# Patient Record
Sex: MALE | URBAN - METROPOLITAN AREA
[De-identification: names, ages, dates, MRNs, and addresses within clinical notes are randomized per-mention and may not be internally consistent; named-entity substitution may affect disease eponyms.]

---

## 2023-05-27 ENCOUNTER — INPATIENT (INPATIENT)
Facility: HOSPITAL | Age: 68
LOS: 6 days | Discharge: HOME | End: 2023-06-03
Attending: HOSPITALIST | Admitting: HOSPITALIST
Payer: MEDICAID

## 2023-05-27 VITALS
RESPIRATION RATE: 20 BRPM | SYSTOLIC BLOOD PRESSURE: 178 MMHG | DIASTOLIC BLOOD PRESSURE: 103 MMHG | TEMPERATURE: 95 F | OXYGEN SATURATION: 100 % | HEART RATE: 62 BPM

## 2023-05-27 PROCEDURE — 99285 EMERGENCY DEPT VISIT HI MDM: CPT

## 2023-05-27 RX ORDER — ASPIRIN/CALCIUM CARB/MAGNESIUM 324 MG
324 TABLET ORAL ONCE
Refills: 0 | Status: COMPLETED | OUTPATIENT
Start: 2023-05-27 | End: 2023-05-27

## 2023-05-27 RX ADMIN — Medication 324 MILLIGRAM(S): at 23:48

## 2023-05-27 NOTE — ED ADULT NURSE NOTE - NSFALLUNIVINTERV_ED_ALL_ED
Bed/Stretcher in lowest position, wheels locked, appropriate side rails in place/Call bell, personal items and telephone in reach/Instruct patient to call for assistance before getting out of bed/chair/stretcher/Non-slip footwear applied when patient is off stretcher/[place] to call system/Physically safe environment - no spills, clutter or unnecessary equipment/Purposeful proactive rounding/Room/bathroom lighting operational, light cord in reach

## 2023-05-27 NOTE — ED PROVIDER NOTE - ATTENDING CONTRIBUTION TO CARE
Adelina Askew MD attending physician the patient arrives in the company of his family.  He was at a family party today and he was drinking fairly heavily and then got to a point where he started looking like he was going to pass out.  As per the family it does not appear that he is a daily drinker.  His listed medications include Voltaren Cardura or Norvasc enalapril metformin atorvastatin aspirin pantoprazole omeprazole as skin which lowers triglycerides paracetamol loperamide gliclazide T ADA LAF IL.  Here the patient is interactive is able to say that he is in the hospital but is clearly intoxicated.  His abdomen is soft no rebound no guarding and he is nontremulous blood pressure is 178/103 respiratory rate 20 heart rate is 62 and his temperature is 95.4 his fingerstick in triage was 80.  He is hypothermic and I am a little concerned about his sugar of 80.  I would send labs and make sure that he can eat something here given the possibility for hypoglycemia with eating alcohol.  He is able to move all extremities I am not concerned about something like a stroke he also needs an EKG he will be signed out at midnight to the next team    I performed a history and physical exam of the patient and discussed their management with the resident and /or advanced care provider. I reviewed the resident and /or ACP's note and agree with the documented findings and plan of care. My medical decison making and observations are found above.

## 2023-05-27 NOTE — ED ADULT NURSE NOTE - OBJECTIVE STATEMENT
pt received to  rm 1 , a&ox4 , ambulatory , phx of DM 2  , p/w near syncopal episode when drinking. pt flew to [place] from [place] today  and was drinking at a party . Pt breathing even and unlabored on room air.  Denies fever, chills, cough, SOB, chest pain, palpitations, dizziness, N/V/D, constipation, numbness, tingling. IV placed. Labs collected and sent. EKG in chart. pending lab r  . pt educated on fall precautions and confirms understanding via teach back method. Stretcher locked in lowest position with siderails up x2. Call bell and personal items within reach.

## 2023-05-27 NOTE — ED PROVIDER NOTE - CLINICAL SUMMARY MEDICAL DECISION MAKING FREE TEXT BOX
Adelina Askew MD attending physician the patient arrives in the company of his family.  He was at a family party today and he was drinking fairly heavily and then got to a point where he started looking like he was going to pass out.  As per the family it does not appear that he is a daily drinker.  His listed medications include Voltaren Cardura or Norvasc enalapril metformin atorvastatin aspirin pantoprazole omeprazole as skin which lowers triglycerides paracetamol loperamide gliclazide T ADA LAF IL.  Here the patient is interactive is able to say that he is in the hospital but is clearly intoxicated.  His abdomen is soft no rebound no guarding and he is nontremulous blood pressure is 178/103 respiratory rate 20 heart rate is 62 and his temperature is 95.4 his fingerstick in triage was 80.  He is hypothermic and I am a little concerned about his sugar of 80.  I would send labs and make sure that he can eat something here given the possibility for hypoglycemia with eating alcohol.  He is able to move all extremities I am not concerned about something like a stroke he also needs an EKG he will be signed out at midnight to the next team Adelina Askew MD attending physician the patient arrives in the company of his family.  He was at a family party today and he was drinking fairly heavily and then got to a point where he started looking like he was going to pass out.  As per the family it does not appear that he is a daily drinker.  His listed medications include Voltaren Cardura or Norvasc enalapril metformin atorvastatin aspirin pantoprazole omeprazole as skin which lowers triglycerides paracetamol loperamide gliclazide T ADA LAF IL.  Here the patient is interactive is able to say that he is in the hospital but is clearly intoxicated.  His abdomen is soft no rebound no guarding and he is nontremulous blood pressure is 178/103 respiratory rate 20 heart rate is 62 and his temperature is 95.4 his fingerstick in triage was 80.  He is hypothermic and I am a little concerned about his sugar of 80.  I would send labs and make sure that he can eat something here given the possibility for hypoglycemia with eating alcohol.  He is able to move all extremities I am not concerned about something like a stroke he also needs an EKG he will be signed out at midnight to the next team    Of note family adds that he had an episode where he nearly passed out and appeared to be short of breath when he was at the party.  His EKG shows T waves that are flipped laterally and flat T waves inferiorly.  There could be a cardiac origin for this.  He will require repeat EKG and repeat troponins

## 2023-05-27 NOTE — ED PROVIDER NOTE - CONSTITUTIONAL, MLM
Well appearing, awake, alert, oriented to person, place, time/situation and in no apparent distress. +mild intox normal...

## 2023-05-27 NOTE — ED ADULT NURSE NOTE - CHIEF COMPLAINT QUOTE
intox    pt arrived from [place] today.. speaks Amharic and Vietnamese.  pt c/o not feeling well. admits to having several bacardis.  pt aa&ox3. denies pain.  pmhx- cardiac, diabetes.  states he has an allergy to med he received in [place] for fever but cannot recall the name.  cousin- shauna booth [phone].. brother in law maxine leon erase [phone]

## 2023-05-27 NOTE — ED PROVIDER NOTE - OBJECTIVE STATEMENT
ambulatory
68-year-old male history of hypertension, diabetes, hyperlipidemia multiple antihypertensives and metformin presenting  with near syncopal episode.   Accompanied by family at bedside who are providing collateral information as.  Is intoxicated.  Patient was at a family party earlier this evening, had a couple glasses of Bacardi and had a near syncopal episode.  Patient did not lose consciousness, however had his eyes closed and was having difficulty breathing so was brought over to the ER.  Patient lives in [place] and  is not followed by cardiology.  Has never had stents in the past.  He takes a baby aspirin every day.  Patient is currently intoxicated but able to answer questions and is cooperative with exam.  He is denying any chest pain or shortness of breath currently.  No nausea, vomiting, fevers or chills.

## 2023-05-27 NOTE — ED ADULT TRIAGE NOTE - CHIEF COMPLAINT QUOTE
intox    pt arrived from [place] today.. speaks Setswana and Latvian.  pt c/o not feeling well. admits to having several bacardis.  pt aa&ox3. denies pain.  pmhx- cardiac, diabetes.  states he has an allergy to med he received in [place] for fever but cannot recall the name. intox    pt arrived from [place] today.. speaks Ukrainian and Belarusian.  pt c/o not feeling well. admits to having several bacardis.  pt aa&ox3. denies pain.  pmhx- cardiac, diabetes.  states he has an allergy to med he received in [place] for fever but cannot recall the name.  cousin- shauna booth [phone].. brother in law maxine leon erase [phone]

## 2023-05-27 NOTE — ED PROVIDER NOTE - CPE EDP HEME LYMPH NORM
Pre-Surgery Instructions:   Medication Instructions    cetirizine (ZyrTEC) 10 mg tablet Instructed patient per Anesthesia Guidelines   Multiple Vitamin (MULTIVITAMIN) tablet Instructed patient per Anesthesia Guidelines  normal...

## 2023-05-28 DIAGNOSIS — N17.9 ACUTE KIDNEY FAILURE, UNSPECIFIED: ICD-10-CM

## 2023-05-28 DIAGNOSIS — E78.5 HYPERLIPIDEMIA, UNSPECIFIED: ICD-10-CM

## 2023-05-28 DIAGNOSIS — Z29.9 ENCOUNTER FOR PROPHYLACTIC MEASURES, UNSPECIFIED: ICD-10-CM

## 2023-05-28 DIAGNOSIS — E11.9 TYPE 2 DIABETES MELLITUS WITHOUT COMPLICATIONS: ICD-10-CM

## 2023-05-28 DIAGNOSIS — R55 SYNCOPE AND COLLAPSE: ICD-10-CM

## 2023-05-28 DIAGNOSIS — R91.8 OTHER NONSPECIFIC ABNORMAL FINDING OF LUNG FIELD: ICD-10-CM

## 2023-05-28 DIAGNOSIS — I10 ESSENTIAL (PRIMARY) HYPERTENSION: ICD-10-CM

## 2023-05-28 LAB
A1C WITH ESTIMATED AVERAGE GLUCOSE RESULT: 4.6 % — SIGNIFICANT CHANGE UP (ref 4–5.6)
ALBUMIN SERPL ELPH-MCNC: 4.2 G/DL — SIGNIFICANT CHANGE UP (ref 3.3–5)
ALBUMIN SERPL ELPH-MCNC: 4.5 G/DL — SIGNIFICANT CHANGE UP (ref 3.3–5)
ALP SERPL-CCNC: 58 U/L — SIGNIFICANT CHANGE UP (ref 40–120)
ALP SERPL-CCNC: 67 U/L — SIGNIFICANT CHANGE UP (ref 40–120)
ALT FLD-CCNC: 22 U/L — SIGNIFICANT CHANGE UP (ref 4–41)
ALT FLD-CCNC: 27 U/L — SIGNIFICANT CHANGE UP (ref 4–41)
ANION GAP SERPL CALC-SCNC: 13 MMOL/L — SIGNIFICANT CHANGE UP (ref 7–14)
ANION GAP SERPL CALC-SCNC: 14 MMOL/L — SIGNIFICANT CHANGE UP (ref 7–14)
APTT BLD: 33.4 SEC — SIGNIFICANT CHANGE UP (ref 27–36.3)
AST SERPL-CCNC: 36 U/L — SIGNIFICANT CHANGE UP (ref 4–40)
AST SERPL-CCNC: 46 U/L — HIGH (ref 4–40)
BASOPHILS # BLD AUTO: 0.07 K/UL — SIGNIFICANT CHANGE UP (ref 0–0.2)
BASOPHILS NFR BLD AUTO: 2 % — SIGNIFICANT CHANGE UP (ref 0–2)
BILIRUB SERPL-MCNC: 0.3 MG/DL — SIGNIFICANT CHANGE UP (ref 0.2–1.2)
BILIRUB SERPL-MCNC: 0.4 MG/DL — SIGNIFICANT CHANGE UP (ref 0.2–1.2)
BUN SERPL-MCNC: 22 MG/DL — SIGNIFICANT CHANGE UP (ref 7–23)
BUN SERPL-MCNC: 22 MG/DL — SIGNIFICANT CHANGE UP (ref 7–23)
CALCIUM SERPL-MCNC: 8.7 MG/DL — SIGNIFICANT CHANGE UP (ref 8.4–10.5)
CALCIUM SERPL-MCNC: 9 MG/DL — SIGNIFICANT CHANGE UP (ref 8.4–10.5)
CHLORIDE SERPL-SCNC: 102 MMOL/L — SIGNIFICANT CHANGE UP (ref 98–107)
CHLORIDE SERPL-SCNC: 105 MMOL/L — SIGNIFICANT CHANGE UP (ref 98–107)
CHOLEST SERPL-MCNC: 164 MG/DL — SIGNIFICANT CHANGE UP
CO2 SERPL-SCNC: 19 MMOL/L — LOW (ref 22–31)
CO2 SERPL-SCNC: 20 MMOL/L — LOW (ref 22–31)
CREAT SERPL-MCNC: 1.49 MG/DL — HIGH (ref 0.5–1.3)
CREAT SERPL-MCNC: 1.61 MG/DL — HIGH (ref 0.5–1.3)
D DIMER BLD IA.RAPID-MCNC: <150 NG/ML DDU — SIGNIFICANT CHANGE UP
EGFR: 47 ML/MIN/1.73M2 — LOW
EGFR: 51 ML/MIN/1.73M2 — LOW
EOSINOPHIL # BLD AUTO: 0.1 K/UL — SIGNIFICANT CHANGE UP (ref 0–0.5)
EOSINOPHIL NFR BLD AUTO: 2.8 % — SIGNIFICANT CHANGE UP (ref 0–6)
ESTIMATED AVERAGE GLUCOSE: 85 — SIGNIFICANT CHANGE UP
ETHANOL SERPL-MCNC: 257 MG/DL — HIGH
GLUCOSE BLDC GLUCOMTR-MCNC: 101 MG/DL — HIGH (ref 70–99)
GLUCOSE BLDC GLUCOMTR-MCNC: 121 MG/DL — HIGH (ref 70–99)
GLUCOSE BLDC GLUCOMTR-MCNC: 122 MG/DL — HIGH (ref 70–99)
GLUCOSE BLDC GLUCOMTR-MCNC: 63 MG/DL — LOW (ref 70–99)
GLUCOSE BLDC GLUCOMTR-MCNC: 64 MG/DL — LOW (ref 70–99)
GLUCOSE BLDC GLUCOMTR-MCNC: 88 MG/DL — SIGNIFICANT CHANGE UP (ref 70–99)
GLUCOSE BLDC GLUCOMTR-MCNC: 95 MG/DL — SIGNIFICANT CHANGE UP (ref 70–99)
GLUCOSE SERPL-MCNC: 68 MG/DL — LOW (ref 70–99)
GLUCOSE SERPL-MCNC: 72 MG/DL — SIGNIFICANT CHANGE UP (ref 70–99)
HCT VFR BLD CALC: 32.8 % — LOW (ref 39–50)
HCT VFR BLD CALC: 35.1 % — LOW (ref 39–50)
HCV AB S/CO SERPL IA: 0.09 S/CO — SIGNIFICANT CHANGE UP (ref 0–0.99)
HCV AB SERPL-IMP: SIGNIFICANT CHANGE UP
HDLC SERPL-MCNC: 97 MG/DL — SIGNIFICANT CHANGE UP
HGB BLD-MCNC: 10.7 G/DL — LOW (ref 13–17)
HGB BLD-MCNC: 11.3 G/DL — LOW (ref 13–17)
IANC: 1.22 K/UL — LOW (ref 1.8–7.4)
IMM GRANULOCYTES NFR BLD AUTO: 0 % — SIGNIFICANT CHANGE UP (ref 0–0.9)
INR BLD: 0.96 RATIO — SIGNIFICANT CHANGE UP (ref 0.88–1.16)
LACTATE SERPL-SCNC: 2.1 MMOL/L — HIGH (ref 0.5–2)
LIPID PNL WITH DIRECT LDL SERPL: 23 MG/DL — SIGNIFICANT CHANGE UP
LYMPHOCYTES # BLD AUTO: 1.93 K/UL — SIGNIFICANT CHANGE UP (ref 1–3.3)
LYMPHOCYTES # BLD AUTO: 54.4 % — HIGH (ref 13–44)
MAGNESIUM SERPL-MCNC: 2 MG/DL — SIGNIFICANT CHANGE UP (ref 1.6–2.6)
MCHC RBC-ENTMCNC: 31.8 PG — SIGNIFICANT CHANGE UP (ref 27–34)
MCHC RBC-ENTMCNC: 32.2 GM/DL — SIGNIFICANT CHANGE UP (ref 32–36)
MCHC RBC-ENTMCNC: 32.6 GM/DL — SIGNIFICANT CHANGE UP (ref 32–36)
MCHC RBC-ENTMCNC: 32.8 PG — SIGNIFICANT CHANGE UP (ref 27–34)
MCV RBC AUTO: 101.7 FL — HIGH (ref 80–100)
MCV RBC AUTO: 97.6 FL — SIGNIFICANT CHANGE UP (ref 80–100)
MONOCYTES # BLD AUTO: 0.23 K/UL — SIGNIFICANT CHANGE UP (ref 0–0.9)
MONOCYTES NFR BLD AUTO: 6.5 % — SIGNIFICANT CHANGE UP (ref 2–14)
NEUTROPHILS # BLD AUTO: 1.22 K/UL — LOW (ref 1.8–7.4)
NEUTROPHILS NFR BLD AUTO: 34.3 % — LOW (ref 43–77)
NON HDL CHOLESTEROL: 67 MG/DL — SIGNIFICANT CHANGE UP
NRBC # BLD: 0 /100 WBCS — SIGNIFICANT CHANGE UP (ref 0–0)
NRBC # BLD: 0 /100 WBCS — SIGNIFICANT CHANGE UP (ref 0–0)
NRBC # FLD: 0 K/UL — SIGNIFICANT CHANGE UP (ref 0–0)
NRBC # FLD: 0 K/UL — SIGNIFICANT CHANGE UP (ref 0–0)
NT-PROBNP SERPL-SCNC: 284 PG/ML — SIGNIFICANT CHANGE UP
PLATELET # BLD AUTO: 216 K/UL — SIGNIFICANT CHANGE UP (ref 150–400)
PLATELET # BLD AUTO: 229 K/UL — SIGNIFICANT CHANGE UP (ref 150–400)
POTASSIUM SERPL-MCNC: 4.2 MMOL/L — SIGNIFICANT CHANGE UP (ref 3.5–5.3)
POTASSIUM SERPL-MCNC: 4.5 MMOL/L — SIGNIFICANT CHANGE UP (ref 3.5–5.3)
POTASSIUM SERPL-SCNC: 4.2 MMOL/L — SIGNIFICANT CHANGE UP (ref 3.5–5.3)
POTASSIUM SERPL-SCNC: 4.5 MMOL/L — SIGNIFICANT CHANGE UP (ref 3.5–5.3)
PROCALCITONIN SERPL-MCNC: 0.06 NG/ML — SIGNIFICANT CHANGE UP (ref 0.02–0.1)
PROT SERPL-MCNC: 6.7 G/DL — SIGNIFICANT CHANGE UP (ref 6–8.3)
PROT SERPL-MCNC: 7.5 G/DL — SIGNIFICANT CHANGE UP (ref 6–8.3)
PROTHROM AB SERPL-ACNC: 11.1 SEC — SIGNIFICANT CHANGE UP (ref 10.5–13.4)
RBC # BLD: 3.36 M/UL — LOW (ref 4.2–5.8)
RBC # BLD: 3.45 M/UL — LOW (ref 4.2–5.8)
RBC # FLD: 17.3 % — HIGH (ref 10.3–14.5)
RBC # FLD: 17.5 % — HIGH (ref 10.3–14.5)
SODIUM SERPL-SCNC: 135 MMOL/L — SIGNIFICANT CHANGE UP (ref 135–145)
SODIUM SERPL-SCNC: 138 MMOL/L — SIGNIFICANT CHANGE UP (ref 135–145)
TRIGL SERPL-MCNC: 222 MG/DL — HIGH
TROPONIN T, HIGH SENSITIVITY RESULT: 19 NG/L — SIGNIFICANT CHANGE UP
TROPONIN T, HIGH SENSITIVITY RESULT: 21 NG/L — SIGNIFICANT CHANGE UP
TSH SERPL-MCNC: 2.74 UIU/ML — SIGNIFICANT CHANGE UP (ref 0.27–4.2)
WBC # BLD: 2.75 K/UL — LOW (ref 3.8–10.5)
WBC # BLD: 3.55 K/UL — LOW (ref 3.8–10.5)
WBC # FLD AUTO: 2.75 K/UL — LOW (ref 3.8–10.5)
WBC # FLD AUTO: 3.55 K/UL — LOW (ref 3.8–10.5)

## 2023-05-28 PROCEDURE — 71045 X-RAY EXAM CHEST 1 VIEW: CPT | Mod: 26

## 2023-05-28 PROCEDURE — 12345: CPT | Mod: NC

## 2023-05-28 PROCEDURE — 99223 1ST HOSP IP/OBS HIGH 75: CPT

## 2023-05-28 RX ORDER — INSULIN LISPRO 100/ML
VIAL (ML) SUBCUTANEOUS
Refills: 0 | Status: DISCONTINUED | OUTPATIENT
Start: 2023-05-28 | End: 2023-05-29

## 2023-05-28 RX ORDER — DEXTROSE 50 % IN WATER 50 %
12.5 SYRINGE (ML) INTRAVENOUS ONCE
Refills: 0 | Status: DISCONTINUED | OUTPATIENT
Start: 2023-05-28 | End: 2023-05-29

## 2023-05-28 RX ORDER — ATORVASTATIN CALCIUM 80 MG/1
40 TABLET, FILM COATED ORAL AT BEDTIME
Refills: 0 | Status: DISCONTINUED | OUTPATIENT
Start: 2023-05-28 | End: 2023-05-29

## 2023-05-28 RX ORDER — DEXTROSE 50 % IN WATER 50 %
25 SYRINGE (ML) INTRAVENOUS ONCE
Refills: 0 | Status: DISCONTINUED | OUTPATIENT
Start: 2023-05-28 | End: 2023-05-29

## 2023-05-28 RX ORDER — DEXTROSE 50 % IN WATER 50 %
15 SYRINGE (ML) INTRAVENOUS ONCE
Refills: 0 | Status: DISCONTINUED | OUTPATIENT
Start: 2023-05-28 | End: 2023-05-29

## 2023-05-28 RX ORDER — ASPIRIN/CALCIUM CARB/MAGNESIUM 324 MG
81 TABLET ORAL DAILY
Refills: 0 | Status: DISCONTINUED | OUTPATIENT
Start: 2023-05-28 | End: 2023-06-03

## 2023-05-28 RX ORDER — POLYETHYLENE GLYCOL 3350 17 G/17G
17 POWDER, FOR SOLUTION ORAL
Refills: 0 | Status: COMPLETED | OUTPATIENT
Start: 2023-05-28 | End: 2023-05-28

## 2023-05-28 RX ORDER — SODIUM CHLORIDE 9 MG/ML
1000 INJECTION, SOLUTION INTRAVENOUS
Refills: 0 | Status: DISCONTINUED | OUTPATIENT
Start: 2023-05-28 | End: 2023-05-29

## 2023-05-28 RX ORDER — INSULIN LISPRO 100/ML
VIAL (ML) SUBCUTANEOUS AT BEDTIME
Refills: 0 | Status: DISCONTINUED | OUTPATIENT
Start: 2023-05-28 | End: 2023-05-29

## 2023-05-28 RX ORDER — FOLIC ACID 0.8 MG
1 TABLET ORAL DAILY
Refills: 0 | Status: DISCONTINUED | OUTPATIENT
Start: 2023-05-29 | End: 2023-06-03

## 2023-05-28 RX ORDER — LANOLIN ALCOHOL/MO/W.PET/CERES
3 CREAM (GRAM) TOPICAL AT BEDTIME
Refills: 0 | Status: DISCONTINUED | OUTPATIENT
Start: 2023-05-28 | End: 2023-06-03

## 2023-05-28 RX ORDER — SODIUM CHLORIDE 9 MG/ML
1000 INJECTION, SOLUTION INTRAVENOUS
Refills: 0 | Status: COMPLETED | OUTPATIENT
Start: 2023-05-28 | End: 2023-05-28

## 2023-05-28 RX ORDER — ONDANSETRON 8 MG/1
4 TABLET, FILM COATED ORAL EVERY 8 HOURS
Refills: 0 | Status: DISCONTINUED | OUTPATIENT
Start: 2023-05-28 | End: 2023-06-03

## 2023-05-28 RX ORDER — THIAMINE MONONITRATE (VIT B1) 100 MG
100 TABLET ORAL DAILY
Refills: 0 | Status: COMPLETED | OUTPATIENT
Start: 2023-05-29 | End: 2023-05-31

## 2023-05-28 RX ORDER — GLUCAGON INJECTION, SOLUTION 0.5 MG/.1ML
1 INJECTION, SOLUTION SUBCUTANEOUS ONCE
Refills: 0 | Status: DISCONTINUED | OUTPATIENT
Start: 2023-05-28 | End: 2023-06-03

## 2023-05-28 RX ORDER — ACETAMINOPHEN 500 MG
650 TABLET ORAL EVERY 6 HOURS
Refills: 0 | Status: DISCONTINUED | OUTPATIENT
Start: 2023-05-28 | End: 2023-06-03

## 2023-05-28 RX ADMIN — POLYETHYLENE GLYCOL 3350 17 GRAM(S): 17 POWDER, FOR SOLUTION ORAL at 04:12

## 2023-05-28 RX ADMIN — POLYETHYLENE GLYCOL 3350 17 GRAM(S): 17 POWDER, FOR SOLUTION ORAL at 05:19

## 2023-05-28 RX ADMIN — Medication 81 MILLIGRAM(S): at 09:21

## 2023-05-28 RX ADMIN — ATORVASTATIN CALCIUM 40 MILLIGRAM(S): 80 TABLET, FILM COATED ORAL at 22:05

## 2023-05-28 RX ADMIN — SODIUM CHLORIDE 100 MILLILITER(S): 9 INJECTION, SOLUTION INTRAVENOUS at 15:06

## 2023-05-28 NOTE — H&P ADULT - HISTORY OF PRESENT ILLNESS
68-year-old male history of hypertension, diabetes, hyperlipidemia multiple antihypertensives and metformin presenting  with near syncopal episode.   Accompanied by family at bedside who are providing collateral information as.  Is intoxicated.  Patient was at a family party earlier this evening, had a couple glasses of Bacardi and had a near syncopal episode.  Patient did not lose consciousness, however had his eyes closed and was having difficulty breathing so was brought over to the ER.  Patient lives in [place] and  is not followed by cardiology.  Has never had stents in the past.  He takes a baby aspirin every day.  Patient is currently intoxicated but able to answer questions and is cooperative with exam.  He is denying any chest pain or shortness of breath currently.  No nausea, vomiting, fevers or chills. Hx gathered from patient via  [id number]. Also collateral from Nikolai Hoskins [phone]. Unable to reach cousin. Hx also gathered from chart review.  68-year-old male history of hypertension, diabetes, hyperlipidemia presented to [facility] for near syncopal episodes after drinking more than 5 glasses of Bicardi according to the brother in law. Patient states he does not know how he got to the hospital but is guarded with his alcohol history. According to Nikolai, patient had a lot of alcohol and looked like he was about to pass out but never hit his head or fell. He lives in [place] and is not followed by cardiology and he does not know his medications and medical history. There were reports of complaints of dyspnea and chest pain. Currently A/O x3 and denies chest pain, dyspnea, nausea, vomiting, fevers or chills. Patient came here 5/22 after a 7 hour flight. No leg swelling reported.  In the Ed, VSS. WBC 3.55, HGB 11.3, Cr 1.61. ETOH 257. EKG: NSR V4-V5

## 2023-05-28 NOTE — H&P ADULT - PROBLEM SELECTOR PLAN 2
Patient denies cough. CXR nonspecific  -Will get d-dimer and post-test probability for PE  -If D-dimer low and improving Cr, will get CT chest non cont, if high, will get CTA Chest  -Hold further antibiotics for now  -Check CRP and procalcitonin, if high, may start ceftriaxone and Azithromycin

## 2023-05-28 NOTE — H&P ADULT - NSHPPHYSICALEXAM_GEN_ALL_CORE
PHYSICAL EXAM:  GENERAL: NAD, comfortable appearing  HEAD:  Atraumatic, Normocephalic  EYES: EOMI, PERRL, conjunctiva and sclera clear  NECK: Supple, No JVD  CHEST/LUNG: Clear to auscultation bilaterally; No wheezes, rales or rhonchi  HEART: Regular rate and rhythm; No murmurs, rubs, or gallops, (+)S1, S2  ABDOMEN: Soft, Nontender, Nondistended; Normal Bowel sounds   EXTREMITIES:  2+ Peripheral Pulses, No clubbing, cyanosis, or edema  PSYCH: normal mood and affect  NEUROLOGY: AAOx3, non-focal, no tremors  SKIN: No rashes or lesions

## 2023-05-28 NOTE — PROVIDER CONTACT NOTE (HYPOGLYCEMIA EVENT) - NS PROVIDER CONTACT NOTE-TREATMENT-HYPO
Apple Juice @ 08:45    Breakfast was served at 09:10, pt ate 2 pancakes, 1 sausage and another 4oz Apple juice/4 oz Fruit Juice (Specify quantity, date/time)

## 2023-05-28 NOTE — H&P ADULT - PROBLEM SELECTOR PLAN 1
His symptoms and clinical history sounds more like intoxication. He does have a few risk factors for ACS vs PE however he states he is feeling better and denies dyspnea or chest pain  -Continue ASA for now  -Repeat STAT labs  -Echo and Tele  -D-dimer to evaluate for post-test probability as his Well's score is low  -Cardiac consult

## 2023-05-28 NOTE — H&P ADULT - ASSESSMENT
68-year-old male history of hypertension, diabetes, hyperlipidemia multiple antihypertensives and metformin presenting  with near syncopal episode in the setting of ETOH. Currently admitted for pre-syncope work up.

## 2023-05-28 NOTE — PROVIDER CONTACT NOTE (HYPOGLYCEMIA EVENT) - NS PROVIDER CONTACT CONTRIBUTING FACTORS OF EPISODE
Pt came from ED stated he did not eat since yesterday afternoon/Poor oral intake within the last 24 hours/Previous finger stick less than 100 mg/dL

## 2023-05-28 NOTE — PROVIDER CONTACT NOTE (HYPOGLYCEMIA EVENT) - NS PROVIDER CONTACT BACKGROUND-HYPO
Age: 68y    Gender: Male    POCT Blood Glucose:  122 mg/dL (05-28-23 @ 09:35)  88 mg/dL (05-28-23 @ 09:06)  63 mg/dL (05-28-23 @ 08:41)  64 mg/dL (05-28-23 @ 08:40)  80 mg/dL (05-27-23 @ 22:33)      eMAR:

## 2023-05-28 NOTE — PATIENT PROFILE ADULT - FALL HARM RISK - HARM RISK INTERVENTIONS
Assistance with ambulation/Communicate Risk of Fall with Harm to all staff/Monitor gait and stability/Reinforce activity limits and safety measures with patient and family/Review medications for side effects contributing to fall risk/Sit up slowly, dangle for a short time, stand at bedside before walking/Tailored Fall Risk Interventions/Use of alarms - bed, chair and/or voice tab/Visual Cue: Yellow wristband and red socks/Bed in lowest position, wheels locked, appropriate side rails in place/Call bell, personal items and telephone in reach/Instruct patient to call for assistance before getting out of bed or chair/Non-slip footwear when patient is out of bed/[place] to call system/Physically safe environment - no spills, clutter or unnecessary equipment/Purposeful Proactive Rounding/Room/bathroom lighting operational, light cord in reach

## 2023-05-28 NOTE — H&P ADULT - NSHPLABSRESULTS_GEN_ALL_CORE
05-27    135  |  102  |  22  ----------------------------<  72  4.2   |  19<L>  |  1.61<H>    Ca    9.0      27 May 2023 23:53    TPro  7.5  /  Alb  4.5  /  TBili  0.3  /  DBili  x   /  AST  46<H>  /  ALT  27  /  AlkPhos  67  05-27                       11.3   3.55  )-----------( 229      ( 27 May 2023 23:53 )             35.1     LIVER FUNCTIONS - ( 27 May 2023 23:53 )  Alb: 4.5 g/dL / Pro: 7.5 g/dL / ALK PHOS: 67 U/L / ALT: 27 U/L / AST: 46 U/L / GGT: x           EKG: NSR, , TWI V4-V5    Image: None 05-27    135  |  102  |  22  ----------------------------<  72  4.2   |  19<L>  |  1.61<H>    Ca    9.0      27 May 2023 23:53    TPro  7.5  /  Alb  4.5  /  TBili  0.3  /  DBili  x   /  AST  46<H>  /  ALT  27  /  AlkPhos  67  05-27                       11.3   3.55  )-----------( 229      ( 27 May 2023 23:53 )             35.1     LIVER FUNCTIONS - ( 27 May 2023 23:53 )  Alb: 4.5 g/dL / Pro: 7.5 g/dL / ALK PHOS: 67 U/L / ALT: 27 U/L / AST: 46 U/L / GGT: x           EKG: NSR, , TWI V4-V5    Image: questionable L infiltrate

## 2023-05-29 DIAGNOSIS — Z78.9 OTHER SPECIFIED HEALTH STATUS: ICD-10-CM

## 2023-05-29 LAB
ANION GAP SERPL CALC-SCNC: 14 MMOL/L — SIGNIFICANT CHANGE UP (ref 7–14)
APPEARANCE UR: CLEAR — SIGNIFICANT CHANGE UP
BILIRUB UR-MCNC: NEGATIVE — SIGNIFICANT CHANGE UP
BUN SERPL-MCNC: 20 MG/DL — SIGNIFICANT CHANGE UP (ref 7–23)
CALCIUM SERPL-MCNC: 8 MG/DL — LOW (ref 8.4–10.5)
CHLORIDE SERPL-SCNC: 106 MMOL/L — SIGNIFICANT CHANGE UP (ref 98–107)
CO2 SERPL-SCNC: 18 MMOL/L — LOW (ref 22–31)
COLOR SPEC: SIGNIFICANT CHANGE UP
CREAT SERPL-MCNC: 1.5 MG/DL — HIGH (ref 0.5–1.3)
DIFF PNL FLD: NEGATIVE — SIGNIFICANT CHANGE UP
EGFR: 51 ML/MIN/1.73M2 — LOW
GLUCOSE BLDC GLUCOMTR-MCNC: 116 MG/DL — HIGH (ref 70–99)
GLUCOSE BLDC GLUCOMTR-MCNC: 98 MG/DL — SIGNIFICANT CHANGE UP (ref 70–99)
GLUCOSE SERPL-MCNC: 92 MG/DL — SIGNIFICANT CHANGE UP (ref 70–99)
GLUCOSE UR QL: NEGATIVE — SIGNIFICANT CHANGE UP
HCT VFR BLD CALC: 31.3 % — LOW (ref 39–50)
HGB BLD-MCNC: 10.2 G/DL — LOW (ref 13–17)
KETONES UR-MCNC: NEGATIVE — SIGNIFICANT CHANGE UP
LEUKOCYTE ESTERASE UR-ACNC: NEGATIVE — SIGNIFICANT CHANGE UP
MCHC RBC-ENTMCNC: 32.1 PG — SIGNIFICANT CHANGE UP (ref 27–34)
MCHC RBC-ENTMCNC: 32.6 GM/DL — SIGNIFICANT CHANGE UP (ref 32–36)
MCV RBC AUTO: 98.4 FL — SIGNIFICANT CHANGE UP (ref 80–100)
NITRITE UR-MCNC: NEGATIVE — SIGNIFICANT CHANGE UP
NRBC # BLD: 0 /100 WBCS — SIGNIFICANT CHANGE UP (ref 0–0)
NRBC # FLD: 0 K/UL — SIGNIFICANT CHANGE UP (ref 0–0)
PH UR: 7 — SIGNIFICANT CHANGE UP (ref 5–8)
PLATELET # BLD AUTO: 211 K/UL — SIGNIFICANT CHANGE UP (ref 150–400)
POTASSIUM SERPL-MCNC: 4.2 MMOL/L — SIGNIFICANT CHANGE UP (ref 3.5–5.3)
POTASSIUM SERPL-SCNC: 4.2 MMOL/L — SIGNIFICANT CHANGE UP (ref 3.5–5.3)
PROT UR-MCNC: ABNORMAL
RBC # BLD: 3.18 M/UL — LOW (ref 4.2–5.8)
RBC # FLD: 17.4 % — HIGH (ref 10.3–14.5)
SODIUM SERPL-SCNC: 138 MMOL/L — SIGNIFICANT CHANGE UP (ref 135–145)
SP GR SPEC: 1.01 — SIGNIFICANT CHANGE UP (ref 1.01–1.05)
UROBILINOGEN FLD QL: SIGNIFICANT CHANGE UP
WBC # BLD: 3.55 K/UL — LOW (ref 3.8–10.5)
WBC # FLD AUTO: 3.55 K/UL — LOW (ref 3.8–10.5)

## 2023-05-29 PROCEDURE — 71250 CT THORAX DX C-: CPT | Mod: 26

## 2023-05-29 PROCEDURE — 99232 SBSQ HOSP IP/OBS MODERATE 35: CPT

## 2023-05-29 RX ORDER — AMLODIPINE BESYLATE 2.5 MG/1
10 TABLET ORAL ONCE
Refills: 0 | Status: COMPLETED | OUTPATIENT
Start: 2023-05-29 | End: 2023-05-29

## 2023-05-29 RX ORDER — HYDRALAZINE HCL 50 MG
10 TABLET ORAL ONCE
Refills: 0 | Status: COMPLETED | OUTPATIENT
Start: 2023-05-29 | End: 2023-05-29

## 2023-05-29 RX ORDER — ATORVASTATIN CALCIUM 80 MG/1
20 TABLET, FILM COATED ORAL AT BEDTIME
Refills: 0 | Status: DISCONTINUED | OUTPATIENT
Start: 2023-05-29 | End: 2023-06-03

## 2023-05-29 RX ORDER — DOXAZOSIN MESYLATE 4 MG
2 TABLET ORAL AT BEDTIME
Refills: 0 | Status: DISCONTINUED | OUTPATIENT
Start: 2023-05-29 | End: 2023-06-03

## 2023-05-29 RX ORDER — MAGNESIUM SULFATE 500 MG/ML
2 VIAL (ML) INJECTION ONCE
Refills: 0 | Status: COMPLETED | OUTPATIENT
Start: 2023-05-29 | End: 2023-05-29

## 2023-05-29 RX ORDER — AMLODIPINE BESYLATE 2.5 MG/1
10 TABLET ORAL AT BEDTIME
Refills: 0 | Status: DISCONTINUED | OUTPATIENT
Start: 2023-05-30 | End: 2023-06-03

## 2023-05-29 RX ADMIN — Medication 2 MILLIGRAM(S): at 22:37

## 2023-05-29 RX ADMIN — ATORVASTATIN CALCIUM 20 MILLIGRAM(S): 80 TABLET, FILM COATED ORAL at 22:37

## 2023-05-29 RX ADMIN — Medication 650 MILLIGRAM(S): at 23:16

## 2023-05-29 RX ADMIN — Medication 100 MILLIGRAM(S): at 10:15

## 2023-05-29 RX ADMIN — Medication 10 MILLIGRAM(S): at 15:30

## 2023-05-29 RX ADMIN — AMLODIPINE BESYLATE 10 MILLIGRAM(S): 2.5 TABLET ORAL at 12:52

## 2023-05-29 RX ADMIN — Medication 1 MILLIGRAM(S): at 10:15

## 2023-05-29 RX ADMIN — Medication 1 TABLET(S): at 10:15

## 2023-05-29 RX ADMIN — Medication 81 MILLIGRAM(S): at 10:15

## 2023-05-29 RX ADMIN — Medication 25 GRAM(S): at 10:12

## 2023-05-29 NOTE — PROGRESS NOTE ADULT - PROBLEM SELECTOR PLAN 1
-Troponin 21-->19  -Continue telemetry   -Obtain TTE   -orthostatics neg   -d-dimer < 150 -Possibly related to alcohol use   -Troponin 21-->19  -Continue telemetry   -Obtain TTE   -orthostatics neg   -d-dimer < 150

## 2023-05-29 NOTE — PROVIDER CONTACT NOTE (OTHER) - SITUATION
Pt /61 after 10mg IVP hydralazine.
Pt /70 this morning. Pt is asymptomatic. Pt is not on any BP meds.
Pt /71 after giving 10mg PO amlodipine. Pt is still asymptomatic.

## 2023-05-29 NOTE — PROVIDER CONTACT NOTE (OTHER) - ACTION/TREATMENT ORDERED:
Cont to monitor.
Hydralazine 10mg IVP ordered. Cont to monitor.
Amlodipine 10mg PO stat ordered. Cont to monitor.

## 2023-05-29 NOTE — PROVIDER CONTACT NOTE (OTHER) - BACKGROUND
Pt BP has been running high. Pt is on CIWA protocol, scoring zero.
Pt is admitted for syncope. [facility] protocol in place.

## 2023-05-29 NOTE — CHART NOTE - NSCHARTNOTEFT_GEN_A_CORE
patient has home medications in his bag in the closet.  reviewed meds:    enalapril 20mg po qam - hold for now   norvasc 10mg po at night - will give dose now and starting 5/31 change dose to night   cardura 2mg po at night   atorvastatin 20mg po at night   metformin - hold while in the hospital patient has home medications in his bag in the closet.  reviewed meds:    enalapril 20mg po qam - hold for now   norvasc 10mg po at night - will give dose now and starting 5/31 change dose to night   cardura 2mg po at night   atorvastatin 20mg po at night   metformin - hold while in the hospital    discussed above with attending patient has home medications in his bag in the closet.  reviewed meds:    enalapril 20mg po qam - hold for now   norvasc 10mg po at night - will give dose now and starting 5/31 change dose to night   cardura 2mg po at night   atorvastatin 20mg po at night   metformin - hold while in the hospital    discussed above with attending    addendum to above 5/29 3:30   received norvasc early today, bp remains 180/70's will give dose of hydralazine

## 2023-05-29 NOTE — PROGRESS NOTE ADULT - PROBLEM SELECTOR PLAN 3
Unsure if this is baseline vs JESSICA  -holding ACE   -Avoid nephrotoxic agents   -Monitor Cr Unsure if this is CKD vs JESSICA  -holding ACE   -Avoid nephrotoxic agents   -check UA and renal US   -Monitor Cr

## 2023-05-30 LAB
ANION GAP SERPL CALC-SCNC: 11 MMOL/L — SIGNIFICANT CHANGE UP (ref 7–14)
B PERT DNA SPEC QL NAA+PROBE: SIGNIFICANT CHANGE UP
B PERT+PARAPERT DNA PNL SPEC NAA+PROBE: SIGNIFICANT CHANGE UP
BORDETELLA PARAPERTUSSIS (RAPRVP): SIGNIFICANT CHANGE UP
BUN SERPL-MCNC: 19 MG/DL — SIGNIFICANT CHANGE UP (ref 7–23)
C PNEUM DNA SPEC QL NAA+PROBE: SIGNIFICANT CHANGE UP
CALCIUM SERPL-MCNC: 8.6 MG/DL — SIGNIFICANT CHANGE UP (ref 8.4–10.5)
CHLORIDE SERPL-SCNC: 105 MMOL/L — SIGNIFICANT CHANGE UP (ref 98–107)
CO2 SERPL-SCNC: 19 MMOL/L — LOW (ref 22–31)
CREAT SERPL-MCNC: 1.29 MG/DL — SIGNIFICANT CHANGE UP (ref 0.5–1.3)
EGFR: 61 ML/MIN/1.73M2 — SIGNIFICANT CHANGE UP
FLUAV SUBTYP SPEC NAA+PROBE: SIGNIFICANT CHANGE UP
FLUBV RNA SPEC QL NAA+PROBE: SIGNIFICANT CHANGE UP
GLUCOSE SERPL-MCNC: 126 MG/DL — HIGH (ref 70–99)
HADV DNA SPEC QL NAA+PROBE: SIGNIFICANT CHANGE UP
HCOV 229E RNA SPEC QL NAA+PROBE: SIGNIFICANT CHANGE UP
HCOV HKU1 RNA SPEC QL NAA+PROBE: SIGNIFICANT CHANGE UP
HCOV NL63 RNA SPEC QL NAA+PROBE: SIGNIFICANT CHANGE UP
HCOV OC43 RNA SPEC QL NAA+PROBE: SIGNIFICANT CHANGE UP
HCT VFR BLD CALC: 31.1 % — LOW (ref 39–50)
HGB BLD-MCNC: 10.1 G/DL — LOW (ref 13–17)
HMPV RNA SPEC QL NAA+PROBE: SIGNIFICANT CHANGE UP
HPIV1 RNA SPEC QL NAA+PROBE: SIGNIFICANT CHANGE UP
HPIV2 RNA SPEC QL NAA+PROBE: SIGNIFICANT CHANGE UP
HPIV3 RNA SPEC QL NAA+PROBE: SIGNIFICANT CHANGE UP
HPIV4 RNA SPEC QL NAA+PROBE: SIGNIFICANT CHANGE UP
M PNEUMO DNA SPEC QL NAA+PROBE: SIGNIFICANT CHANGE UP
MAGNESIUM SERPL-MCNC: 2 MG/DL — SIGNIFICANT CHANGE UP (ref 1.6–2.6)
MCHC RBC-ENTMCNC: 32.2 PG — SIGNIFICANT CHANGE UP (ref 27–34)
MCHC RBC-ENTMCNC: 32.5 GM/DL — SIGNIFICANT CHANGE UP (ref 32–36)
MCV RBC AUTO: 99 FL — SIGNIFICANT CHANGE UP (ref 80–100)
NRBC # BLD: 0 /100 WBCS — SIGNIFICANT CHANGE UP (ref 0–0)
NRBC # FLD: 0 K/UL — SIGNIFICANT CHANGE UP (ref 0–0)
PHOSPHATE SERPL-MCNC: 3 MG/DL — SIGNIFICANT CHANGE UP (ref 2.5–4.5)
PLATELET # BLD AUTO: 202 K/UL — SIGNIFICANT CHANGE UP (ref 150–400)
POTASSIUM SERPL-MCNC: 3.9 MMOL/L — SIGNIFICANT CHANGE UP (ref 3.5–5.3)
POTASSIUM SERPL-SCNC: 3.9 MMOL/L — SIGNIFICANT CHANGE UP (ref 3.5–5.3)
RAPID RVP RESULT: SIGNIFICANT CHANGE UP
RBC # BLD: 3.14 M/UL — LOW (ref 4.2–5.8)
RBC # FLD: 17.1 % — HIGH (ref 10.3–14.5)
RSV RNA SPEC QL NAA+PROBE: SIGNIFICANT CHANGE UP
RV+EV RNA SPEC QL NAA+PROBE: SIGNIFICANT CHANGE UP
SARS-COV-2 RNA SPEC QL NAA+PROBE: SIGNIFICANT CHANGE UP
SODIUM SERPL-SCNC: 135 MMOL/L — SIGNIFICANT CHANGE UP (ref 135–145)
WBC # BLD: 3.32 K/UL — LOW (ref 3.8–10.5)
WBC # FLD AUTO: 3.32 K/UL — LOW (ref 3.8–10.5)

## 2023-05-30 PROCEDURE — 99254 IP/OBS CNSLTJ NEW/EST MOD 60: CPT | Mod: GC

## 2023-05-30 PROCEDURE — 99233 SBSQ HOSP IP/OBS HIGH 50: CPT

## 2023-05-30 PROCEDURE — 93306 TTE W/DOPPLER COMPLETE: CPT | Mod: 26

## 2023-05-30 PROCEDURE — 76770 US EXAM ABDO BACK WALL COMP: CPT | Mod: 26

## 2023-05-30 RX ADMIN — Medication 1 MILLIGRAM(S): at 12:26

## 2023-05-30 RX ADMIN — Medication 650 MILLIGRAM(S): at 15:10

## 2023-05-30 RX ADMIN — AMLODIPINE BESYLATE 10 MILLIGRAM(S): 2.5 TABLET ORAL at 21:17

## 2023-05-30 RX ADMIN — ATORVASTATIN CALCIUM 20 MILLIGRAM(S): 80 TABLET, FILM COATED ORAL at 21:17

## 2023-05-30 RX ADMIN — Medication 2 MILLIGRAM(S): at 21:17

## 2023-05-30 RX ADMIN — Medication 100 MILLIGRAM(S): at 12:26

## 2023-05-30 RX ADMIN — Medication 650 MILLIGRAM(S): at 14:15

## 2023-05-30 RX ADMIN — Medication 1 TABLET(S): at 12:26

## 2023-05-30 RX ADMIN — Medication 81 MILLIGRAM(S): at 12:25

## 2023-05-30 RX ADMIN — Medication 650 MILLIGRAM(S): at 00:00

## 2023-05-30 NOTE — PROGRESS NOTE ADULT - PROBLEM SELECTOR PLAN 1
-Possibly related to alcohol use   -Troponin 21-->19  -Continue telemetry   - TTE Normal left ventricular systolic function. No segmental wallmotion abnormalities. Normal right ventricular size and function.  -orthostatics neg   -d-dimer < 150

## 2023-05-30 NOTE — CONSULT NOTE ADULT - ASSESSMENT
67 yo with etoh use, htn, hld, Dm who presents after near syncopal event in setting of etoh consumption. Found to have ground glass opacities in NAVYA, LLL, RLL, and RUL. Pulmonary consulted for abnormal ct chest.    # multifocal ground glass opacities  On room air, no leukocytosis, afebrile. Possible that pt aspirated 2-3 weeks ago when pt was drinking. Was not previously treated  - would treat for CAP (no need to iv abx, can give levaquin)  - incentive spirometry  - oob to chair, ambulation  - will need OP pulm follow up and repeat ct chest in 6-8 weeks to f/u for resolution    Prior to discharge:  Please email: [email or web address] to setup an appointment prior to discharge. The appointment should be within 1-2 weeks of discharge from the hospital. Include the patient's name, , MRN and contact information in the email.      Pulmonary/[facility] Clinic  [address] [place] [address]  [phone]        Kina Gonzalez MD  PCCM Fellow

## 2023-05-30 NOTE — CONSULT NOTE ADULT - SUBJECTIVE AND OBJECTIVE BOX
HPI:  69 yo M with PMH HTN, DM, HLD who presented to [facility] with near syncope after etoh consumption.   Patient elected for family to translate. Family provided additional history. Report that 2-3 weeks ago patient fell in setting of etoh consumption and also had cough. Cough has since resolved. No sob, chest pain. On room air.   No leukocytosis.     Pt reports drinking 3 bottles of wine daily along with whiskey. Lives in [place]. Living in [place] for 2 months. Medications are from [place].   No pulm hx. Non smoker. +ETOH consumption.        PAST MEDICAL & SURGICAL HISTORY:  Hypertension      Hyperlipidemia      Diabetes mellitus      No significant past surgical history          FAMILY HISTORY:  No pertinent family history in first degree relatives        SOCIAL HISTORY:  Smoking: [x] Never Smoked [ ] Former Smoker (__ packs x ___ years) [ ] Current Smoker  (__ packs x ___ years)  Substance Use: [ ] Never Used [ ] Used ____  EtOH Use:  Marital Status: [ ] Single [ ]  [ ]  [ ]   Sexual History:   Occupation:  Recent Travel: [place]   Country of Birth:  Advance Directives:    Allergies    reports had a reaction to med in [place] given for fever but cannot recall name of med or reaction (Other)    Intolerances        HOME MEDICATIONS:  Home Medications:      REVIEW OF SYSTEMS:  All systems negative except as documented above.    OBJECTIVE:  ICU Vital Signs Last 24 Hrs  T(C): 36.8 (30 May 2023 06:05), Max: 36.9 (29 May 2023 21:34)  T(F): 98.3 (30 May 2023 06:05), Max: 98.4 (29 May 2023 21:34)  HR: 66 (30 May 2023 06:05) (61 - 66)  BP: 161/61 (30 May 2023 06:05) (150/74 - 172/61)  BP(mean): --  ABP: --  ABP(mean): --  RR: 18 (30 May 2023 06:05) (16 - 18)  SpO2: 100% (30 May 2023 06:05) (100% - 100%)    O2 Parameters below as of 30 May 2023 06:05  Patient On (Oxygen Delivery Method): room air              CAPILLARY BLOOD GLUCOSE      POCT Blood Glucose.: 116 mg/dL (29 May 2023 12:41)      PHYSICAL EXAM:  General: NAD  HEENT: EOMI, sclera anicteric  Neck: supple  Cardiovascular: RR  Respiratory: CTAB, no wheezes, crackles, or rhonci  Abdomen: soft  Extremities: warm and well perfused, no edema, no clubbing  Skin: no rashes  Neurological: AOx3, no focal deficits    HOSPITAL MEDICATIONS:  Standing Meds:  amLODIPine   Tablet 10 milliGRAM(s) Oral at bedtime  aspirin  chewable 81 milliGRAM(s) Oral daily  atorvastatin 20 milliGRAM(s) Oral at bedtime  doxazosin 2 milliGRAM(s) Oral at bedtime  folic acid 1 milliGRAM(s) Oral daily  glucagon  Injectable 1 milliGRAM(s) IntraMuscular once  multivitamin 1 Tablet(s) Oral daily  thiamine 100 milliGRAM(s) Oral daily      PRN Meds:  acetaminophen     Tablet .. 650 milliGRAM(s) Oral every 6 hours PRN  aluminum hydroxide/magnesium hydroxide/simethicone Suspension 30 milliLiter(s) Oral every 4 hours PRN  LORazepam   Injectable 2 milliGRAM(s) IV Push every 2 hours PRN  melatonin 3 milliGRAM(s) Oral at bedtime PRN  ondansetron Injectable 4 milliGRAM(s) IV Push every 8 hours PRN      LABS:                        10.1   3.32  )-----------( 202      ( 30 May 2023 06:55 )             31.1     Hgb Trend: 10.1<--, 10.2<--, 10.7<--, 11.3<--  05-30    135  |  105  |  19  ----------------------------<  126<H>  3.9   |  19<L>  |  1.29    Ca    8.6      30 May 2023 06:55  Phos  3.0     05-30  Mg     2.00     05-30      Creatinine Trend: 1.29<--, 1.50<--, 1.49<--, 1.61<--    Urinalysis Basic - ( 29 May 2023 17:15 )    Color: Light Yellow / Appearance: Clear / S.015 / pH: x  Gluc: x / Ketone: Negative  / Bili: Negative / Urobili: <2 mg/dL   Blood: x / Protein: Trace / Nitrite: Negative   Leuk Esterase: Negative / RBC: x / WBC x   Sq Epi: x / Non Sq Epi: x / Bacteria: x            MICROBIOLOGY:       RADIOLOGY:  [x] Reviewed and interpreted by me

## 2023-05-30 NOTE — CONSULT NOTE ADULT - ATTENDING COMMENTS
Multifocal opacities on CT chest.  Patient reports coughing 2 - 3 weeks prior, around the time he reported was falling and it may be possible he is recovering from recent pneumonia. Would treat for CAP, can give PO.  Repeat imaging in 6 - 8 weeks.  No pulmonary contraindication for discharge.  Email [email or web address] when patient is ready for discharge for follow up.

## 2023-05-30 NOTE — PROGRESS NOTE ADULT - PROBLEM SELECTOR PLAN 3
Unsure if this is CKD vs JESSICA  -holding ACE   -Avoid nephrotoxic agents   - UA neg  - renal US showed No hydronephrosis of either kidney.  -Monitor Cr

## 2023-05-31 PROBLEM — Z00.00 ENCOUNTER FOR PREVENTIVE HEALTH EXAMINATION: Status: ACTIVE | Noted: 2023-05-31

## 2023-05-31 LAB
ANION GAP SERPL CALC-SCNC: 13 MMOL/L — SIGNIFICANT CHANGE UP (ref 7–14)
BASOPHILS # BLD AUTO: 0.04 K/UL — SIGNIFICANT CHANGE UP (ref 0–0.2)
BASOPHILS NFR BLD AUTO: 1.2 % — SIGNIFICANT CHANGE UP (ref 0–2)
BUN SERPL-MCNC: 22 MG/DL — SIGNIFICANT CHANGE UP (ref 7–23)
CALCIUM SERPL-MCNC: 8.5 MG/DL — SIGNIFICANT CHANGE UP (ref 8.4–10.5)
CHLORIDE SERPL-SCNC: 105 MMOL/L — SIGNIFICANT CHANGE UP (ref 98–107)
CO2 SERPL-SCNC: 17 MMOL/L — LOW (ref 22–31)
CREAT SERPL-MCNC: 1.34 MG/DL — HIGH (ref 0.5–1.3)
EGFR: 58 ML/MIN/1.73M2 — LOW
EOSINOPHIL # BLD AUTO: 0.06 K/UL — SIGNIFICANT CHANGE UP (ref 0–0.5)
EOSINOPHIL NFR BLD AUTO: 1.7 % — SIGNIFICANT CHANGE UP (ref 0–6)
GLUCOSE SERPL-MCNC: 128 MG/DL — HIGH (ref 70–99)
HCT VFR BLD CALC: 31.6 % — LOW (ref 39–50)
HGB BLD-MCNC: 10.4 G/DL — LOW (ref 13–17)
IANC: 1.69 K/UL — LOW (ref 1.8–7.4)
IMM GRANULOCYTES NFR BLD AUTO: 0.3 % — SIGNIFICANT CHANGE UP (ref 0–0.9)
LYMPHOCYTES # BLD AUTO: 1.28 K/UL — SIGNIFICANT CHANGE UP (ref 1–3.3)
LYMPHOCYTES # BLD AUTO: 37 % — SIGNIFICANT CHANGE UP (ref 13–44)
MAGNESIUM SERPL-MCNC: 1.7 MG/DL — SIGNIFICANT CHANGE UP (ref 1.6–2.6)
MCHC RBC-ENTMCNC: 32 PG — SIGNIFICANT CHANGE UP (ref 27–34)
MCHC RBC-ENTMCNC: 32.9 GM/DL — SIGNIFICANT CHANGE UP (ref 32–36)
MCV RBC AUTO: 97.2 FL — SIGNIFICANT CHANGE UP (ref 80–100)
MONOCYTES # BLD AUTO: 0.38 K/UL — SIGNIFICANT CHANGE UP (ref 0–0.9)
MONOCYTES NFR BLD AUTO: 11 % — SIGNIFICANT CHANGE UP (ref 2–14)
NEUTROPHILS # BLD AUTO: 1.69 K/UL — LOW (ref 1.8–7.4)
NEUTROPHILS NFR BLD AUTO: 48.8 % — SIGNIFICANT CHANGE UP (ref 43–77)
NRBC # BLD: 0 /100 WBCS — SIGNIFICANT CHANGE UP (ref 0–0)
NRBC # FLD: 0 K/UL — SIGNIFICANT CHANGE UP (ref 0–0)
PHOSPHATE SERPL-MCNC: 2.4 MG/DL — LOW (ref 2.5–4.5)
PLATELET # BLD AUTO: 197 K/UL — SIGNIFICANT CHANGE UP (ref 150–400)
POTASSIUM SERPL-MCNC: 4 MMOL/L — SIGNIFICANT CHANGE UP (ref 3.5–5.3)
POTASSIUM SERPL-SCNC: 4 MMOL/L — SIGNIFICANT CHANGE UP (ref 3.5–5.3)
RBC # BLD: 3.25 M/UL — LOW (ref 4.2–5.8)
RBC # FLD: 16.3 % — HIGH (ref 10.3–14.5)
SODIUM SERPL-SCNC: 135 MMOL/L — SIGNIFICANT CHANGE UP (ref 135–145)
WBC # BLD: 3.46 K/UL — LOW (ref 3.8–10.5)
WBC # FLD AUTO: 3.46 K/UL — LOW (ref 3.8–10.5)

## 2023-05-31 PROCEDURE — 70450 CT HEAD/BRAIN W/O DYE: CPT | Mod: 26

## 2023-05-31 PROCEDURE — 99233 SBSQ HOSP IP/OBS HIGH 50: CPT

## 2023-05-31 RX ORDER — METOPROLOL TARTRATE 50 MG
25 TABLET ORAL DAILY
Refills: 0 | Status: DISCONTINUED | OUTPATIENT
Start: 2023-05-31 | End: 2023-06-03

## 2023-05-31 RX ORDER — SODIUM,POTASSIUM PHOSPHATES 278-250MG
1 POWDER IN PACKET (EA) ORAL ONCE
Refills: 0 | Status: COMPLETED | OUTPATIENT
Start: 2023-05-31 | End: 2023-05-31

## 2023-05-31 RX ORDER — LEVOFLOXACIN 5 MG/ML
1 INJECTION, SOLUTION INTRAVENOUS
Qty: 4 | Refills: 0
Start: 2023-05-31 | End: 2023-06-03

## 2023-05-31 RX ADMIN — Medication 1 TABLET(S): at 18:13

## 2023-05-31 RX ADMIN — Medication 3 MILLIGRAM(S): at 21:08

## 2023-05-31 RX ADMIN — Medication 2 MILLIGRAM(S): at 21:08

## 2023-05-31 RX ADMIN — Medication 1 TABLET(S): at 11:22

## 2023-05-31 RX ADMIN — Medication 25 MILLIGRAM(S): at 13:37

## 2023-05-31 RX ADMIN — ATORVASTATIN CALCIUM 20 MILLIGRAM(S): 80 TABLET, FILM COATED ORAL at 21:08

## 2023-05-31 RX ADMIN — AMLODIPINE BESYLATE 10 MILLIGRAM(S): 2.5 TABLET ORAL at 21:08

## 2023-05-31 RX ADMIN — Medication 81 MILLIGRAM(S): at 11:21

## 2023-05-31 RX ADMIN — Medication 1 MILLIGRAM(S): at 11:21

## 2023-05-31 RX ADMIN — Medication 100 MILLIGRAM(S): at 12:37

## 2023-05-31 NOTE — PROGRESS NOTE ADULT - PROBLEM SELECTOR PLAN 1
-Possibly related to alcohol use   -Troponin 21-->19  -Continue telemetry   - TTE Normal left ventricular systolic function. No segmental wallmotion abnormalities. Normal right ventricular size and function.  -orthostatics neg   -d-dimer < 150  -Head ct No evidence of acute intracranial abnormality.  No evidence of hemorrhage.

## 2023-05-31 NOTE — SBIRT NOTE ADULT - NSSBIRTALCPOSREINDET_GEN_A_CORE
SW  met with patient to complete SBIRT.  services used ([phone]) Jenny Rayo ID [id number]. Pt indicates that he does not drink much in [place] as his wife does not drink and does not like him drinking. Pt states he typicall drinks on holidays or occasions and never drinks more than 6 drinks. Reports he is visiting familyt and had more alcohol than normal. Reports that he does not remember what led up to hospitalization. Pt states that his sons have expressed concern about his drinking and impact on his health. He reports that he respects his sons and will not be drinking moving forward. Writer normalized need for support in helping to cut down/abstain from alcohol. Pt declines need for resources at this time. Writer left contact information should pt need further assistance prior to dc

## 2023-06-01 LAB
ANION GAP SERPL CALC-SCNC: 12 MMOL/L — SIGNIFICANT CHANGE UP (ref 7–14)
BASOPHILS # BLD AUTO: 0.04 K/UL — SIGNIFICANT CHANGE UP (ref 0–0.2)
BASOPHILS NFR BLD AUTO: 1.1 % — SIGNIFICANT CHANGE UP (ref 0–2)
BUN SERPL-MCNC: 28 MG/DL — HIGH (ref 7–23)
CALCIUM SERPL-MCNC: 8.2 MG/DL — LOW (ref 8.4–10.5)
CHLORIDE SERPL-SCNC: 106 MMOL/L — SIGNIFICANT CHANGE UP (ref 98–107)
CO2 SERPL-SCNC: 17 MMOL/L — LOW (ref 22–31)
CREAT SERPL-MCNC: 1.7 MG/DL — HIGH (ref 0.5–1.3)
EGFR: 44 ML/MIN/1.73M2 — LOW
EOSINOPHIL # BLD AUTO: 0.08 K/UL — SIGNIFICANT CHANGE UP (ref 0–0.5)
EOSINOPHIL NFR BLD AUTO: 2.2 % — SIGNIFICANT CHANGE UP (ref 0–6)
GLUCOSE SERPL-MCNC: 131 MG/DL — HIGH (ref 70–99)
HCT VFR BLD CALC: 30.7 % — LOW (ref 39–50)
HGB BLD-MCNC: 10.1 G/DL — LOW (ref 13–17)
IANC: 1.75 K/UL — LOW (ref 1.8–7.4)
IMM GRANULOCYTES NFR BLD AUTO: 0.3 % — SIGNIFICANT CHANGE UP (ref 0–0.9)
LYMPHOCYTES # BLD AUTO: 1.41 K/UL — SIGNIFICANT CHANGE UP (ref 1–3.3)
LYMPHOCYTES # BLD AUTO: 38.5 % — SIGNIFICANT CHANGE UP (ref 13–44)
MAGNESIUM SERPL-MCNC: 1.7 MG/DL — SIGNIFICANT CHANGE UP (ref 1.6–2.6)
MCHC RBC-ENTMCNC: 32.9 GM/DL — SIGNIFICANT CHANGE UP (ref 32–36)
MCHC RBC-ENTMCNC: 32.9 PG — SIGNIFICANT CHANGE UP (ref 27–34)
MCV RBC AUTO: 100 FL — SIGNIFICANT CHANGE UP (ref 80–100)
MONOCYTES # BLD AUTO: 0.37 K/UL — SIGNIFICANT CHANGE UP (ref 0–0.9)
MONOCYTES NFR BLD AUTO: 10.1 % — SIGNIFICANT CHANGE UP (ref 2–14)
NEUTROPHILS # BLD AUTO: 1.75 K/UL — LOW (ref 1.8–7.4)
NEUTROPHILS NFR BLD AUTO: 47.8 % — SIGNIFICANT CHANGE UP (ref 43–77)
NRBC # BLD: 0 /100 WBCS — SIGNIFICANT CHANGE UP (ref 0–0)
NRBC # FLD: 0 K/UL — SIGNIFICANT CHANGE UP (ref 0–0)
PHOSPHATE SERPL-MCNC: 3.1 MG/DL — SIGNIFICANT CHANGE UP (ref 2.5–4.5)
PLATELET # BLD AUTO: 189 K/UL — SIGNIFICANT CHANGE UP (ref 150–400)
POTASSIUM SERPL-MCNC: 4.1 MMOL/L — SIGNIFICANT CHANGE UP (ref 3.5–5.3)
POTASSIUM SERPL-SCNC: 4.1 MMOL/L — SIGNIFICANT CHANGE UP (ref 3.5–5.3)
RBC # BLD: 3.07 M/UL — LOW (ref 4.2–5.8)
RBC # FLD: 16.5 % — HIGH (ref 10.3–14.5)
SODIUM SERPL-SCNC: 135 MMOL/L — SIGNIFICANT CHANGE UP (ref 135–145)
WBC # BLD: 3.66 K/UL — LOW (ref 3.8–10.5)
WBC # FLD AUTO: 3.66 K/UL — LOW (ref 3.8–10.5)

## 2023-06-01 PROCEDURE — 99233 SBSQ HOSP IP/OBS HIGH 50: CPT

## 2023-06-01 RX ORDER — SODIUM CHLORIDE 9 MG/ML
1000 INJECTION, SOLUTION INTRAVENOUS
Refills: 0 | Status: DISCONTINUED | OUTPATIENT
Start: 2023-06-01 | End: 2023-06-03

## 2023-06-01 RX ADMIN — Medication 1 MILLIGRAM(S): at 11:57

## 2023-06-01 RX ADMIN — ATORVASTATIN CALCIUM 20 MILLIGRAM(S): 80 TABLET, FILM COATED ORAL at 21:09

## 2023-06-01 RX ADMIN — Medication 25 MILLIGRAM(S): at 05:17

## 2023-06-01 RX ADMIN — AMLODIPINE BESYLATE 10 MILLIGRAM(S): 2.5 TABLET ORAL at 21:09

## 2023-06-01 RX ADMIN — Medication 1 TABLET(S): at 11:57

## 2023-06-01 RX ADMIN — Medication 81 MILLIGRAM(S): at 11:57

## 2023-06-01 RX ADMIN — SODIUM CHLORIDE 70 MILLILITER(S): 9 INJECTION, SOLUTION INTRAVENOUS at 13:00

## 2023-06-01 RX ADMIN — Medication 2 MILLIGRAM(S): at 21:08

## 2023-06-01 RX ADMIN — Medication 3 MILLIGRAM(S): at 21:09

## 2023-06-01 NOTE — PROGRESS NOTE ADULT - PROBLEM SELECTOR PLAN 8
SCD as ppx        dc planning for tomorrow if stable  Plan discussed with ACP SCD as ppx        dc planning for tomorrow if stable  unable to reach son Patrict at [phone] ( provided by pt) on 6/1  Plan discussed with ACP

## 2023-06-01 NOTE — PROGRESS NOTE ADULT - PROBLEM SELECTOR PLAN 3
Unsure if this is CKD vs JESSICA  -holding ACE   -Avoid nephrotoxic agents   - UA neg  - renal US showed No hydronephrosis of either kidney.  -Monitor Cr, uptrended to 1.7 today, check bladder scan Unsure if this is CKD vs JESSICA  -holding ACE   -Avoid nephrotoxic agents   - UA neg  - renal US showed No hydronephrosis of either kidney.  -Monitor Cr, uptrended to 1.7 today, check bladder scan, will give gentle hydration

## 2023-06-02 ENCOUNTER — APPOINTMENT (OUTPATIENT)
Dept: PULMONOLOGY | Facility: CLINIC | Age: 68
End: 2023-06-02

## 2023-06-02 LAB
ANION GAP SERPL CALC-SCNC: 13 MMOL/L — SIGNIFICANT CHANGE UP (ref 7–14)
BUN SERPL-MCNC: 30 MG/DL — HIGH (ref 7–23)
CALCIUM SERPL-MCNC: 8.4 MG/DL — SIGNIFICANT CHANGE UP (ref 8.4–10.5)
CHLORIDE SERPL-SCNC: 102 MMOL/L — SIGNIFICANT CHANGE UP (ref 98–107)
CO2 SERPL-SCNC: 17 MMOL/L — LOW (ref 22–31)
CREAT SERPL-MCNC: 1.58 MG/DL — HIGH (ref 0.5–1.3)
EGFR: 48 ML/MIN/1.73M2 — LOW
GLUCOSE SERPL-MCNC: 132 MG/DL — HIGH (ref 70–99)
HCT VFR BLD CALC: 28.5 % — LOW (ref 39–50)
HGB BLD-MCNC: 9.5 G/DL — LOW (ref 13–17)
MAGNESIUM SERPL-MCNC: 1.5 MG/DL — LOW (ref 1.6–2.6)
MCHC RBC-ENTMCNC: 31.9 PG — SIGNIFICANT CHANGE UP (ref 27–34)
MCHC RBC-ENTMCNC: 33.3 GM/DL — SIGNIFICANT CHANGE UP (ref 32–36)
MCV RBC AUTO: 95.6 FL — SIGNIFICANT CHANGE UP (ref 80–100)
NRBC # BLD: 0 /100 WBCS — SIGNIFICANT CHANGE UP (ref 0–0)
NRBC # FLD: 0 K/UL — SIGNIFICANT CHANGE UP (ref 0–0)
PHOSPHATE SERPL-MCNC: 2.9 MG/DL — SIGNIFICANT CHANGE UP (ref 2.5–4.5)
PLATELET # BLD AUTO: 195 K/UL — SIGNIFICANT CHANGE UP (ref 150–400)
POTASSIUM SERPL-MCNC: 3.9 MMOL/L — SIGNIFICANT CHANGE UP (ref 3.5–5.3)
POTASSIUM SERPL-SCNC: 3.9 MMOL/L — SIGNIFICANT CHANGE UP (ref 3.5–5.3)
RBC # BLD: 2.98 M/UL — LOW (ref 4.2–5.8)
RBC # FLD: 16 % — HIGH (ref 10.3–14.5)
SODIUM SERPL-SCNC: 132 MMOL/L — LOW (ref 135–145)
WBC # BLD: 3.83 K/UL — SIGNIFICANT CHANGE UP (ref 3.8–10.5)
WBC # FLD AUTO: 3.83 K/UL — SIGNIFICANT CHANGE UP (ref 3.8–10.5)

## 2023-06-02 PROCEDURE — 76770 US EXAM ABDO BACK WALL COMP: CPT | Mod: 26

## 2023-06-02 PROCEDURE — 99233 SBSQ HOSP IP/OBS HIGH 50: CPT

## 2023-06-02 RX ORDER — MAGNESIUM SULFATE 500 MG/ML
1 VIAL (ML) INJECTION ONCE
Refills: 0 | Status: COMPLETED | OUTPATIENT
Start: 2023-06-02 | End: 2023-06-02

## 2023-06-02 RX ORDER — SODIUM CHLORIDE 9 MG/ML
1000 INJECTION, SOLUTION INTRAVENOUS
Refills: 0 | Status: DISCONTINUED | OUTPATIENT
Start: 2023-06-02 | End: 2023-06-03

## 2023-06-02 RX ADMIN — Medication 100 GRAM(S): at 10:17

## 2023-06-02 RX ADMIN — Medication 3 MILLIGRAM(S): at 21:28

## 2023-06-02 RX ADMIN — Medication 25 MILLIGRAM(S): at 05:10

## 2023-06-02 RX ADMIN — ATORVASTATIN CALCIUM 20 MILLIGRAM(S): 80 TABLET, FILM COATED ORAL at 21:28

## 2023-06-02 RX ADMIN — Medication 1 TABLET(S): at 12:56

## 2023-06-02 RX ADMIN — Medication 2 MILLIGRAM(S): at 21:28

## 2023-06-02 RX ADMIN — SODIUM CHLORIDE 70 MILLILITER(S): 9 INJECTION, SOLUTION INTRAVENOUS at 12:57

## 2023-06-02 RX ADMIN — Medication 81 MILLIGRAM(S): at 12:56

## 2023-06-02 RX ADMIN — Medication 1 MILLIGRAM(S): at 12:56

## 2023-06-02 RX ADMIN — AMLODIPINE BESYLATE 10 MILLIGRAM(S): 2.5 TABLET ORAL at 21:28

## 2023-06-02 RX ADMIN — SODIUM CHLORIDE 70 MILLILITER(S): 9 INJECTION, SOLUTION INTRAVENOUS at 05:11

## 2023-06-02 NOTE — PROGRESS NOTE ADULT - PROBLEM SELECTOR PLAN 8
SCD as ppx      -Hypomagnesemia- replete mag  f/u kidney sono      dc planning for tomorrow if stable  unable to reach son [place] at [phone] ( provided by pt) on 6/1  Plan discussed with ACP

## 2023-06-02 NOTE — PROGRESS NOTE ADULT - PROBLEM SELECTOR PLAN 1
-Possibly related to alcohol use   -Troponin 21-->19  -Continue telemetry   - TTE Normal left ventricular systolic function. No segmental wall motion abnormalities. Normal right ventricular size and function.  -orthostatics neg   -d-dimer < 150  -Head ct No evidence of acute intracranial abnormality.  No evidence of hemorrhage.

## 2023-06-02 NOTE — PROGRESS NOTE ADULT - PROBLEM SELECTOR PLAN 3
Unsure if this is CKD vs JESSICA  -holding ACE   -Avoid nephrotoxic agents   - UA neg  - renal US showed No hydronephrosis of either kidney.  -Monitor Cr, uptrended to 1.7 on 6/1, await check bladder scan, will give gentle hydration  -creatinine down trended to 1.5 today

## 2023-06-03 ENCOUNTER — TRANSCRIPTION ENCOUNTER (OUTPATIENT)
Age: 68
End: 2023-06-03

## 2023-06-03 VITALS
SYSTOLIC BLOOD PRESSURE: 145 MMHG | OXYGEN SATURATION: 100 % | RESPIRATION RATE: 17 BRPM | TEMPERATURE: 98 F | DIASTOLIC BLOOD PRESSURE: 72 MMHG | HEART RATE: 57 BPM

## 2023-06-03 LAB
ANION GAP SERPL CALC-SCNC: 15 MMOL/L — HIGH (ref 7–14)
BUN SERPL-MCNC: 26 MG/DL — HIGH (ref 7–23)
CALCIUM SERPL-MCNC: 8.6 MG/DL — SIGNIFICANT CHANGE UP (ref 8.4–10.5)
CHLORIDE SERPL-SCNC: 97 MMOL/L — LOW (ref 98–107)
CO2 SERPL-SCNC: 21 MMOL/L — LOW (ref 22–31)
CREAT SERPL-MCNC: 1.36 MG/DL — HIGH (ref 0.5–1.3)
EGFR: 57 ML/MIN/1.73M2 — LOW
GLUCOSE SERPL-MCNC: 181 MG/DL — HIGH (ref 70–99)
POTASSIUM SERPL-MCNC: 4 MMOL/L — SIGNIFICANT CHANGE UP (ref 3.5–5.3)
POTASSIUM SERPL-SCNC: 4 MMOL/L — SIGNIFICANT CHANGE UP (ref 3.5–5.3)
SODIUM SERPL-SCNC: 133 MMOL/L — LOW (ref 135–145)

## 2023-06-03 PROCEDURE — 99239 HOSP IP/OBS DSCHRG MGMT >30: CPT

## 2023-06-03 RX ORDER — AMLODIPINE BESYLATE 2.5 MG/1
1 TABLET ORAL
Qty: 30 | Refills: 0
Start: 2023-06-03 | End: 2023-07-02

## 2023-06-03 RX ORDER — ATORVASTATIN CALCIUM 80 MG/1
1 TABLET, FILM COATED ORAL
Qty: 0 | Refills: 0 | DISCHARGE
Start: 2023-06-03

## 2023-06-03 RX ORDER — ATORVASTATIN CALCIUM 80 MG/1
1 TABLET, FILM COATED ORAL
Qty: 30 | Refills: 0
Start: 2023-06-03 | End: 2023-07-02

## 2023-06-03 RX ORDER — ASPIRIN/CALCIUM CARB/MAGNESIUM 324 MG
1 TABLET ORAL
Qty: 0 | Refills: 0 | DISCHARGE
Start: 2023-06-03

## 2023-06-03 RX ORDER — METOPROLOL TARTRATE 50 MG
1 TABLET ORAL
Qty: 0 | Refills: 0 | DISCHARGE
Start: 2023-06-03

## 2023-06-03 RX ORDER — METOPROLOL TARTRATE 50 MG
1 TABLET ORAL
Qty: 30 | Refills: 0
Start: 2023-06-03 | End: 2023-07-02

## 2023-06-03 RX ORDER — DOXAZOSIN MESYLATE 4 MG
1 TABLET ORAL
Qty: 0 | Refills: 0 | DISCHARGE
Start: 2023-06-03

## 2023-06-03 RX ORDER — AMLODIPINE BESYLATE 2.5 MG/1
1 TABLET ORAL
Qty: 0 | Refills: 0 | DISCHARGE
Start: 2023-06-03

## 2023-06-03 RX ORDER — LEVOFLOXACIN 5 MG/ML
1 INJECTION, SOLUTION INTRAVENOUS
Qty: 4 | Refills: 0
Start: 2023-06-03 | End: 2023-06-06

## 2023-06-03 RX ORDER — LEVOFLOXACIN 5 MG/ML
750 INJECTION, SOLUTION INTRAVENOUS EVERY 24 HOURS
Refills: 0 | Status: DISCONTINUED | OUTPATIENT
Start: 2023-06-03 | End: 2023-06-03

## 2023-06-03 RX ORDER — DOXAZOSIN MESYLATE 4 MG
1 TABLET ORAL
Qty: 30 | Refills: 0
Start: 2023-06-03 | End: 2023-07-02

## 2023-06-03 RX ADMIN — SODIUM CHLORIDE 70 MILLILITER(S): 9 INJECTION, SOLUTION INTRAVENOUS at 01:04

## 2023-06-03 RX ADMIN — Medication 25 MILLIGRAM(S): at 05:02

## 2023-06-03 NOTE — PROGRESS NOTE ADULT - PROBLEM SELECTOR PLAN 8
SCD as ppx            dc  today, dc planning time spent in coordination 45mts ( discussion with son, acp, preparing dc summary and plan),     Plan discussed with ACP

## 2023-06-03 NOTE — PROGRESS NOTE ADULT - PROBLEM SELECTOR PLAN 3
Unsure if this is CKD vs JESSICA  -holding ACE   -Avoid nephrotoxic agents   - UA neg  - renal US showed No hydronephrosis of either kidney.  -Monitor Cr, uptrended to 1.7 on 6/1, renal sono with no hydronephrosis, pt voiding  -creatinine down trended to 1.3 today

## 2023-06-03 NOTE — DISCHARGE NOTE NURSING/CASE MANAGEMENT/SOCIAL WORK - NSDCPEFALRISK_GEN_ALL_CORE
For information on Fall & Injury Prevention, visit: [email or web address] OR  [email or web address] OR  https://www.cdc.gov/steadi/patient.html

## 2023-06-03 NOTE — PROGRESS NOTE ADULT - PROBLEM SELECTOR PLAN 6
c/w atorvastatin
Started atorvastatin
c/w atorvastatin
c/w atorvastatin

## 2023-06-03 NOTE — PROGRESS NOTE ADULT - SUBJECTIVE AND OBJECTIVE BOX
Patient is a 68y old  Male who presents with a chief complaint of Pre-syncope (30 May 2023 16:43)      SUBJECTIVE / OVERNIGHT EVENTS: Pt seen and examined at 11am, no overnight events, pt with no cough, sob or any other complaints, says he is "fine" no other new issues reported.  Niece Jodie later came in to visit him, discussed with niece.        MEDICATIONS  (STANDING):  amLODIPine   Tablet 10 milliGRAM(s) Oral at bedtime  aspirin  chewable 81 milliGRAM(s) Oral daily  atorvastatin 20 milliGRAM(s) Oral at bedtime  doxazosin 2 milliGRAM(s) Oral at bedtime  folic acid 1 milliGRAM(s) Oral daily  glucagon  Injectable 1 milliGRAM(s) IntraMuscular once  levoFLOXacin  Tablet 750 milliGRAM(s) Oral every 24 hours  multivitamin 1 Tablet(s) Oral daily  thiamine 100 milliGRAM(s) Oral daily    MEDICATIONS  (PRN):  acetaminophen     Tablet .. 650 milliGRAM(s) Oral every 6 hours PRN Temp greater or equal to 38C (100.4F), Mild Pain (1 - 3)  aluminum hydroxide/magnesium hydroxide/simethicone Suspension 30 milliLiter(s) Oral every 4 hours PRN Dyspepsia  LORazepam   Injectable 2 milliGRAM(s) IV Push every 2 hours PRN Symptom-triggered: 2 point increase in CIWA -Ar score and a total score of 7 or LESS  melatonin 3 milliGRAM(s) Oral at bedtime PRN Insomnia  ondansetron Injectable 4 milliGRAM(s) IV Push every 8 hours PRN Nausea and/or Vomiting      Vital Signs Last 24 Hrs  T(C): 36.8 (31 May 2023 04:06), Max: 36.9 (30 May 2023 14:00)  T(F): 98.2 (31 May 2023 04:06), Max: 98.4 (30 May 2023 14:00)  HR: 74 (31 May 2023 04:06) (65 - 74)  BP: 163/75 (31 May 2023 04:06) (163/75 - 170/81)  BP(mean): --  RR: 18 (31 May 2023 04:06) (16 - 18)  SpO2: 100% (31 May 2023 04:06) (100% - 100%)    Parameters below as of 31 May 2023 04:06  Patient On (Oxygen Delivery Method): room air      CAPILLARY BLOOD GLUCOSE        I&O's Summary      PHYSICAL EXAM:  GENERAL: NAD  CHEST/LUNG: Clear to auscultation bilaterally; No wheeze  HEART: Regular rate and rhythm  ABDOMEN: Soft, Nontender, Nondistended  EXTREMITIES: no LE edema  PSYCH: Calm  NEUROLOGY: Alert, awake responsive  SKIN: No rashes or lesions    LABS:                        10.4   3.46  )-----------( 197      ( 31 May 2023 06:12 )             31.6         135  |  105  |  22  ----------------------------<  128<H>  4.0   |  17<L>  |  1.34<H>    Ca    8.5      31 May 2023 06:12  Phos  2.4       Mg     1.70                 Urinalysis Basic - ( 29 May 2023 17:15 )    Color: Light Yellow / Appearance: Clear / S.015 / pH: x  Gluc: x / Ketone: Negative  / Bili: Negative / Urobili: <2 mg/dL   Blood: x / Protein: Trace / Nitrite: Negative   Leuk Esterase: Negative / RBC: x / WBC x   Sq Epi: x / Non Sq Epi: x / Bacteria: x        RADIOLOGY & ADDITIONAL TESTS:  < from: CT Head No Cont (23 @ 09:09) >  CT BRAIN       < end of copied text >  < from: CT Head No Cont (23 @ 09:09) >  No evidence of acute intracranial abnormality.  No evidence of hemorrhage.    Chronic changes as above.    < end of copied text >    Imaging Personally Reviewed:    Consultant(s) Notes Reviewed:      Care Discussed with Consultants/Other Providers:  
Patient is a 68y old  Male who presents with a chief complaint of Pre-syncope (31 May 2023 11:53)      SUBJECTIVE / OVERNIGHT EVENTS: Pt seen and examined at 11:20am, no overnight events, Interpretation provided by Belarusian  Graham ID:[id number], pt with no cough, sob or any other complaints, no other new issues reported.        MEDICATIONS  (STANDING):  amLODIPine   Tablet 10 milliGRAM(s) Oral at bedtime  aspirin  chewable 81 milliGRAM(s) Oral daily  atorvastatin 20 milliGRAM(s) Oral at bedtime  doxazosin 2 milliGRAM(s) Oral at bedtime  folic acid 1 milliGRAM(s) Oral daily  glucagon  Injectable 1 milliGRAM(s) IntraMuscular once  levoFLOXacin  Tablet 750 milliGRAM(s) Oral every 24 hours  metoprolol succinate ER 25 milliGRAM(s) Oral daily  multivitamin 1 Tablet(s) Oral daily    MEDICATIONS  (PRN):  acetaminophen     Tablet .. 650 milliGRAM(s) Oral every 6 hours PRN Temp greater or equal to 38C (100.4F), Mild Pain (1 - 3)  aluminum hydroxide/magnesium hydroxide/simethicone Suspension 30 milliLiter(s) Oral every 4 hours PRN Dyspepsia  LORazepam   Injectable 2 milliGRAM(s) IV Push every 2 hours PRN Symptom-triggered: 2 point increase in CIWA -Ar score and a total score of 7 or LESS  melatonin 3 milliGRAM(s) Oral at bedtime PRN Insomnia  ondansetron Injectable 4 milliGRAM(s) IV Push every 8 hours PRN Nausea and/or Vomiting      Vital Signs Last 24 Hrs  T(C): 36.8 (01 Jun 2023 10:28), Max: 36.8 (01 Jun 2023 10:28)  T(F): 98.2 (01 Jun 2023 10:28), Max: 98.2 (01 Jun 2023 10:28)  HR: 60 (01 Jun 2023 10:28) (60 - 74)  BP: 139/59 (01 Jun 2023 10:28) (139/59 - 167/76)  BP(mean): 85 (31 May 2023 21:06) (85 - 85)  RR: 18 (01 Jun 2023 10:28) (16 - 18)  SpO2: 100% (01 Jun 2023 10:28) (100% - 100%)    Parameters below as of 01 Jun 2023 05:01  Patient On (Oxygen Delivery Method): room air      CAPILLARY BLOOD GLUCOSE        I&O's Summary      PHYSICAL EXAM:  GENERAL: NAD  CHEST/LUNG: Clear to auscultation bilaterally; No wheeze  HEART: Regular rate and rhythm  ABDOMEN: Soft, Nontender, Nondistended  EXTREMITIES: no LE edema  PSYCH: Calm  NEUROLOGY: Alert, awake responsive  SKIN: No rashes or lesions    LABS:                        10.1   3.66  )-----------( 189      ( 01 Jun 2023 06:28 )             30.7     06-01    135  |  106  |  28<H>  ----------------------------<  131<H>  4.1   |  17<L>  |  1.70<H>    Ca    8.2<L>      01 Jun 2023 06:28  Phos  3.1     06-01  Mg     1.70     06-01                RADIOLOGY & ADDITIONAL TESTS:    Imaging Personally Reviewed:    Consultant(s) Notes Reviewed:      Care Discussed with Consultants/Other Providers:  
PROGRESS NOTE:     Patient is a 68y old  Male who presents with a chief complaint of Pre-syncope (28 May 2023 12:34)      SUBJECTIVE / OVERNIGHT EVENTS: No acute events.     ADDITIONAL REVIEW OF SYSTEMS:    MEDICATIONS  (STANDING):  aspirin  chewable 81 milliGRAM(s) Oral daily  atorvastatin 20 milliGRAM(s) Oral at bedtime  dextrose 5%. 1000 milliLiter(s) (50 mL/Hr) IV Continuous <Continuous>  dextrose 5%. 1000 milliLiter(s) (100 mL/Hr) IV Continuous <Continuous>  dextrose 50% Injectable 25 Gram(s) IV Push once  dextrose 50% Injectable 25 Gram(s) IV Push once  dextrose 50% Injectable 12.5 Gram(s) IV Push once  doxazosin 2 milliGRAM(s) Oral at bedtime  folic acid 1 milliGRAM(s) Oral daily  glucagon  Injectable 1 milliGRAM(s) IntraMuscular once  insulin lispro (ADMELOG) corrective regimen sliding scale   SubCutaneous at bedtime  insulin lispro (ADMELOG) corrective regimen sliding scale   SubCutaneous three times a day before meals  multivitamin 1 Tablet(s) Oral daily  thiamine 100 milliGRAM(s) Oral daily    MEDICATIONS  (PRN):  acetaminophen     Tablet .. 650 milliGRAM(s) Oral every 6 hours PRN Temp greater or equal to 38C (100.4F), Mild Pain (1 - 3)  aluminum hydroxide/magnesium hydroxide/simethicone Suspension 30 milliLiter(s) Oral every 4 hours PRN Dyspepsia  dextrose Oral Gel 15 Gram(s) Oral once PRN Blood Glucose LESS THAN 70 milliGRAM(s)/deciliter  LORazepam   Injectable 2 milliGRAM(s) IV Push every 2 hours PRN Symptom-triggered: 2 point increase in CIWA -Ar score and a total score of 7 or LESS  melatonin 3 milliGRAM(s) Oral at bedtime PRN Insomnia  ondansetron Injectable 4 milliGRAM(s) IV Push every 8 hours PRN Nausea and/or Vomiting      CAPILLARY BLOOD GLUCOSE      POCT Blood Glucose.: 116 mg/dL (29 May 2023 12:41)  POCT Blood Glucose.: 98 mg/dL (29 May 2023 08:31)  POCT Blood Glucose.: 101 mg/dL (28 May 2023 21:38)  POCT Blood Glucose.: 95 mg/dL (28 May 2023 17:30)    I&O's Summary      PHYSICAL EXAM:  Vital Signs Last 24 Hrs  T(C): 36.9 (29 May 2023 10:00), Max: 37 (28 May 2023 22:00)  T(F): 98.4 (29 May 2023 10:00), Max: 98.6 (28 May 2023 22:00)  HR: 62 (29 May 2023 10:00) (58 - 63)  BP: 180/70 (29 May 2023 10:00) (159/79 - 180/70)  BP(mean): --  RR: 17 (29 May 2023 10:00) (17 - 18)  SpO2: 100% (29 May 2023 10:00) (100% - 100%)    Parameters below as of 29 May 2023 10:00  Patient On (Oxygen Delivery Method): room air      GENERAL: NAD, comfortable appearing  EYES: EOMI, PERRL, conjunctiva and sclera clear  NECK: Supple, No JVD  CHEST/LUNG: Clear to auscultation bilaterally; No wheezes, rales or rhonchi  HEART: Regular rate and rhythm; No murmurs, rubs, or gallops, (+)S1, S2  ABDOMEN: Soft, Nontender, Nondistended; Normal Bowel sounds   EXTREMITIES:  2+ Peripheral Pulses, No clubbing, cyanosis, or edema  PSYCH: normal mood and affect  NEUROLOGY: AAOx3, non-focal, no tremors  SKIN: No rashes or lesions    LABS:                        10.2   3.55  )-----------( 211      ( 29 May 2023 06:30 )             31.3     05-29    138  |  106  |  20  ----------------------------<  92  4.2   |  18<L>  |  1.50<H>    Ca    8.0<L>      29 May 2023 06:30  Mg     2.00     05-28    TPro  6.7  /  Alb  4.2  /  TBili  0.4  /  DBili  x   /  AST  36  /  ALT  22  /  AlkPhos  58  05-28    PT/INR - ( 28 May 2023 06:34 )   PT: 11.1 sec;   INR: 0.96 ratio         PTT - ( 28 May 2023 06:34 )  PTT:33.4 sec            RADIOLOGY & ADDITIONAL TESTS:  Results Reviewed:   Imaging Personally Reviewed:  Electrocardiogram Personally Reviewed:    COORDINATION OF CARE:  Care Discussed with Consultants/Other Providers [Y/N]:  Prior or Outpatient Records Reviewed [Y/N]:  
Patient is a 68y old  Male who presents with a chief complaint of Pre-syncope (29 May 2023 15:00)      SUBJECTIVE / OVERNIGHT EVENTS: Pt seen and examined at 11:55am, no overnight events, pt with no cough, sob or any other complaints, no other new issues reported.    MEDICATIONS  (STANDING):  amLODIPine   Tablet 10 milliGRAM(s) Oral at bedtime  aspirin  chewable 81 milliGRAM(s) Oral daily  atorvastatin 20 milliGRAM(s) Oral at bedtime  doxazosin 2 milliGRAM(s) Oral at bedtime  folic acid 1 milliGRAM(s) Oral daily  glucagon  Injectable 1 milliGRAM(s) IntraMuscular once  multivitamin 1 Tablet(s) Oral daily  thiamine 100 milliGRAM(s) Oral daily    MEDICATIONS  (PRN):  acetaminophen     Tablet .. 650 milliGRAM(s) Oral every 6 hours PRN Temp greater or equal to 38C (100.4F), Mild Pain (1 - 3)  aluminum hydroxide/magnesium hydroxide/simethicone Suspension 30 milliLiter(s) Oral every 4 hours PRN Dyspepsia  LORazepam   Injectable 2 milliGRAM(s) IV Push every 2 hours PRN Symptom-triggered: 2 point increase in CIWA -Ar score and a total score of 7 or LESS  melatonin 3 milliGRAM(s) Oral at bedtime PRN Insomnia  ondansetron Injectable 4 milliGRAM(s) IV Push every 8 hours PRN Nausea and/or Vomiting      Vital Signs Last 24 Hrs  T(C): 36.8 (30 May 2023 06:05), Max: 36.9 (29 May 2023 21:34)  T(F): 98.3 (30 May 2023 06:05), Max: 98.4 (29 May 2023 21:34)  HR: 66 (30 May 2023 06:05) (61 - 66)  BP: 161/61 (30 May 2023 06:05) (150/74 - 172/61)  BP(mean): --  RR: 18 (30 May 2023 06:05) (16 - 18)  SpO2: 100% (30 May 2023 06:05) (100% - 100%)    Parameters below as of 30 May 2023 06:05  Patient On (Oxygen Delivery Method): room air      CAPILLARY BLOOD GLUCOSE        I&O's Summary      PHYSICAL EXAM:  GENERAL: NAD  CHEST/LUNG: Clear to auscultation bilaterally; No wheeze  HEART: Regular rate and rhythm  ABDOMEN: Soft, Nontender, Nondistended  EXTREMITIES: no LE edema  PSYCH: Calm  NEUROLOGY: Alert, awake responsive  SKIN: No rashes or lesions    LABS:                        10.1   3.32  )-----------( 202      ( 30 May 2023 06:55 )             31.1     05-30    135  |  105  |  19  ----------------------------<  126<H>  3.9   |  19<L>  |  1.29    Ca    8.6      30 May 2023 06:55  Phos  3.0       Mg     2.00                 Urinalysis Basic - ( 29 May 2023 17:15 )    Color: Light Yellow / Appearance: Clear / S.015 / pH: x  Gluc: x / Ketone: Negative  / Bili: Negative / Urobili: <2 mg/dL   Blood: x / Protein: Trace / Nitrite: Negative   Leuk Esterase: Negative / RBC: x / WBC x   Sq Epi: x / Non Sq Epi: x / Bacteria: x        RADIOLOGY & ADDITIONAL TESTS:  < from: Transthoracic Echocardiogram (23 @ 08:48) >   Mitral annular calcification, otherwise normal mitral  valve. Minimal mitral regurgitation.  2. Normal left ventricular internal dimensions and wall  thicknesses.  3. Normal left ventricular systolic function. No segmental  wallmotion abnormalities.  4. Normal right ventricular size and function.    < end of copied text >    Imaging Personally Reviewed:  < from: CT Chest No Cont (23 @ 14:39) >  CT CHEST    < end of copied text >  < from: CT Chest No Cont (23 @ 14:39) >  Multifocal groundglass opacities predominantly involving the left upper   lobe as well as the bilateral superior lower lobes and right anterior   upper lobe. Findings may represent multifocal pneumonia in the   appropriate clinical setting.    < from: US Kidney and Bladder (23 @ 11:18) >  US KIDNEYS AND BLADDER    < end of copied text >  < from: US Kidney and Bladder (23 @ 11:18) >  No hydronephrosis of either kidney.    < end of copied text >    < end of copied text >    Consultant(s) Notes Reviewed:      Care Discussed with Consultants/Other Providers:  
PROGRESS NOTE:     Patient is a 68y old  Male who presents with a chief complaint of Pre-syncope (28 May 2023 04:05)      SUBJECTIVE / OVERNIGHT EVENTS: No acute events.     ADDITIONAL REVIEW OF SYSTEMS:    MEDICATIONS  (STANDING):  aspirin  chewable 81 milliGRAM(s) Oral daily  atorvastatin 40 milliGRAM(s) Oral at bedtime  dextrose 5%. 1000 milliLiter(s) (50 mL/Hr) IV Continuous <Continuous>  dextrose 5%. 1000 milliLiter(s) (100 mL/Hr) IV Continuous <Continuous>  dextrose 50% Injectable 25 Gram(s) IV Push once  dextrose 50% Injectable 25 Gram(s) IV Push once  dextrose 50% Injectable 12.5 Gram(s) IV Push once  glucagon  Injectable 1 milliGRAM(s) IntraMuscular once  insulin lispro (ADMELOG) corrective regimen sliding scale   SubCutaneous at bedtime  insulin lispro (ADMELOG) corrective regimen sliding scale   SubCutaneous three times a day before meals  sodium chloride 0.9% 1000 milliLiter(s) (100 mL/Hr) IV Continuous <Continuous>    MEDICATIONS  (PRN):  acetaminophen     Tablet .. 650 milliGRAM(s) Oral every 6 hours PRN Temp greater or equal to 38C (100.4F), Mild Pain (1 - 3)  aluminum hydroxide/magnesium hydroxide/simethicone Suspension 30 milliLiter(s) Oral every 4 hours PRN Dyspepsia  dextrose Oral Gel 15 Gram(s) Oral once PRN Blood Glucose LESS THAN 70 milliGRAM(s)/deciliter  LORazepam   Injectable 2 milliGRAM(s) IV Push every 2 hours PRN Symptom-triggered: 2 point increase in CIWA -Ar score and a total score of 7 or LESS  melatonin 3 milliGRAM(s) Oral at bedtime PRN Insomnia  ondansetron Injectable 4 milliGRAM(s) IV Push every 8 hours PRN Nausea and/or Vomiting      CAPILLARY BLOOD GLUCOSE      POCT Blood Glucose.: 121 mg/dL (28 May 2023 12:15)  POCT Blood Glucose.: 122 mg/dL (28 May 2023 09:35)  POCT Blood Glucose.: 88 mg/dL (28 May 2023 09:06)  POCT Blood Glucose.: 63 mg/dL (28 May 2023 08:41)  POCT Blood Glucose.: 64 mg/dL (28 May 2023 08:40)  POCT Blood Glucose.: 80 mg/dL (27 May 2023 22:33)    I&O's Summary      PHYSICAL EXAM:  Vital Signs Last 24 Hrs  T(C): 36.6 (28 May 2023 11:00), Max: 36.6 (28 May 2023 08:00)  T(F): 97.9 (28 May 2023 11:00), Max: 97.9 (28 May 2023 11:00)  HR: 69 (28 May 2023 11:00) (62 - 76)  BP: 149/68 (28 May 2023 11:00) (137/65 - 178/103)  BP(mean): --  RR: 17 (28 May 2023 11:00) (17 - 20)  SpO2: 100% (28 May 2023 11:00) (100% - 100%)    Parameters below as of 28 May 2023 11:00  Patient On (Oxygen Delivery Method): room air        GENERAL: NAD, comfortable appearing  EYES: EOMI, PERRL, conjunctiva and sclera clear  NECK: Supple, No JVD  CHEST/LUNG: Clear to auscultation bilaterally; No wheezes, rales or rhonchi  HEART: Regular rate and rhythm; No murmurs, rubs, or gallops, (+)S1, S2  ABDOMEN: Soft, Nontender, Nondistended; Normal Bowel sounds   EXTREMITIES:  2+ Peripheral Pulses, No clubbing, cyanosis, or edema  PSYCH: normal mood and affect  NEUROLOGY: AAOx3, non-focal, no tremors  SKIN: No rashes or lesions    LABS:                        10.7   2.75  )-----------( 216      ( 28 May 2023 06:34 )             32.8     05-28    138  |  105  |  22  ----------------------------<  68<L>  4.5   |  20<L>  |  1.49<H>    Ca    8.7      28 May 2023 06:34  Mg     2.00     05-28    TPro  6.7  /  Alb  4.2  /  TBili  0.4  /  DBili  x   /  AST  36  /  ALT  22  /  AlkPhos  58  05-28    PT/INR - ( 28 May 2023 06:34 )   PT: 11.1 sec;   INR: 0.96 ratio         PTT - ( 28 May 2023 06:34 )  PTT:33.4 sec            RADIOLOGY & ADDITIONAL TESTS:  Results Reviewed:   Imaging Personally Reviewed:  Electrocardiogram Personally Reviewed:    COORDINATION OF CARE:  Care Discussed with Consultants/Other Providers [Y/N]:  Prior or Outpatient Records Reviewed [Y/N]:  
Patient is a 68y old  Male who presents with a chief complaint of Pre-syncope (01 Jun 2023 12:29)      SUBJECTIVE / OVERNIGHT EVENTS: Pt seen and examined at 11:05am, no overnight events, Interpretation provided by Latvian  Pier ID:[id number], pt with no cough, sob or any other complaints, no other new issues reported.        MEDICATIONS  (STANDING):  amLODIPine   Tablet 10 milliGRAM(s) Oral at bedtime  aspirin  chewable 81 milliGRAM(s) Oral daily  atorvastatin 20 milliGRAM(s) Oral at bedtime  doxazosin 2 milliGRAM(s) Oral at bedtime  folic acid 1 milliGRAM(s) Oral daily  glucagon  Injectable 1 milliGRAM(s) IntraMuscular once  lactated ringers. 1000 milliLiter(s) (70 mL/Hr) IV Continuous <Continuous>  lactated ringers. 1000 milliLiter(s) (70 mL/Hr) IV Continuous <Continuous>  levoFLOXacin  Tablet 750 milliGRAM(s) Oral every 48 hours  metoprolol succinate ER 25 milliGRAM(s) Oral daily  multivitamin 1 Tablet(s) Oral daily    MEDICATIONS  (PRN):  acetaminophen     Tablet .. 650 milliGRAM(s) Oral every 6 hours PRN Temp greater or equal to 38C (100.4F), Mild Pain (1 - 3)  aluminum hydroxide/magnesium hydroxide/simethicone Suspension 30 milliLiter(s) Oral every 4 hours PRN Dyspepsia  LORazepam   Injectable 2 milliGRAM(s) IV Push every 2 hours PRN Symptom-triggered: 2 point increase in CIWA -Ar score and a total score of 7 or LESS  melatonin 3 milliGRAM(s) Oral at bedtime PRN Insomnia  ondansetron Injectable 4 milliGRAM(s) IV Push every 8 hours PRN Nausea and/or Vomiting      Vital Signs Last 24 Hrs  T(C): 37.1 (02 Jun 2023 04:55), Max: 37.3 (01 Jun 2023 20:53)  T(F): 98.7 (02 Jun 2023 04:55), Max: 99.1 (01 Jun 2023 20:53)  HR: 62 (02 Jun 2023 04:55) (61 - 62)  BP: 151/74 (02 Jun 2023 04:55) (151/74 - 157/82)  BP(mean): --  RR: 18 (02 Jun 2023 04:55) (17 - 18)  SpO2: 100% (02 Jun 2023 04:55) (100% - 100%)    Parameters below as of 02 Jun 2023 04:55  Patient On (Oxygen Delivery Method): room air      CAPILLARY BLOOD GLUCOSE        I&O's Summary    01 Jun 2023 07:01  -  02 Jun 2023 07:00  --------------------------------------------------------  IN: 350 mL / OUT: 600 mL / NET: -250 mL        PHYSICAL EXAM:  GENERAL: NAD  CHEST/LUNG: Clear to auscultation bilaterally; No wheeze  HEART: Regular rate and rhythm  ABDOMEN: Soft, Nontender, Nondistended  EXTREMITIES: no LE edema  PSYCH: Calm  NEUROLOGY: Alert, awake responsive  SKIN: No rashes or lesions    LABS:                        9.5    3.83  )-----------( 195      ( 02 Jun 2023 05:12 )             28.5     06-02    132<L>  |  102  |  30<H>  ----------------------------<  132<H>  3.9   |  17<L>  |  1.58<H>    Ca    8.4      02 Jun 2023 05:12  Phos  2.9     06-02  Mg     1.50     06-02                RADIOLOGY & ADDITIONAL TESTS:    Imaging Personally Reviewed:    Consultant(s) Notes Reviewed:      Care Discussed with Consultants/Other Providers:  
Patient is a 68y old  Male who presents with a chief complaint of Pre-syncope (03 Jun 2023 11:11)      SUBJECTIVE / OVERNIGHT EVENTS: Pt seen and examined at 11:50am, no overnight events, Interpretation provided by pt's son Ronny over the phone per pt's request, pt with no cough, sob or any other complaints, no other new issues reported.      MEDICATIONS  (STANDING):  amLODIPine   Tablet 10 milliGRAM(s) Oral at bedtime  aspirin  chewable 81 milliGRAM(s) Oral daily  atorvastatin 20 milliGRAM(s) Oral at bedtime  doxazosin 2 milliGRAM(s) Oral at bedtime  folic acid 1 milliGRAM(s) Oral daily  glucagon  Injectable 1 milliGRAM(s) IntraMuscular once  lactated ringers. 1000 milliLiter(s) (70 mL/Hr) IV Continuous <Continuous>  lactated ringers. 1000 milliLiter(s) (70 mL/Hr) IV Continuous <Continuous>  levoFLOXacin  Tablet 750 milliGRAM(s) Oral every 24 hours  metoprolol succinate ER 25 milliGRAM(s) Oral daily  multivitamin 1 Tablet(s) Oral daily    MEDICATIONS  (PRN):  acetaminophen     Tablet .. 650 milliGRAM(s) Oral every 6 hours PRN Temp greater or equal to 38C (100.4F), Mild Pain (1 - 3)  aluminum hydroxide/magnesium hydroxide/simethicone Suspension 30 milliLiter(s) Oral every 4 hours PRN Dyspepsia  LORazepam   Injectable 2 milliGRAM(s) IV Push every 2 hours PRN Symptom-triggered: 2 point increase in CIWA -Ar score and a total score of 7 or LESS  melatonin 3 milliGRAM(s) Oral at bedtime PRN Insomnia  ondansetron Injectable 4 milliGRAM(s) IV Push every 8 hours PRN Nausea and/or Vomiting      Vital Signs Last 24 Hrs  T(C): 36.5 (03 Jun 2023 11:17), Max: 36.8 (03 Jun 2023 05:00)  T(F): 97.7 (03 Jun 2023 11:17), Max: 98.3 (03 Jun 2023 05:00)  HR: 57 (03 Jun 2023 11:17) (57 - 67)  BP: 145/72 (03 Jun 2023 11:17) (145/72 - 162/83)  BP(mean): --  RR: 17 (03 Jun 2023 11:17) (17 - 18)  SpO2: 100% (03 Jun 2023 11:17) (100% - 100%)    Parameters below as of 03 Jun 2023 11:17  Patient On (Oxygen Delivery Method): room air      CAPILLARY BLOOD GLUCOSE        I&O's Summary    02 Jun 2023 07:01  -  03 Jun 2023 07:00  --------------------------------------------------------  IN: 840 mL / OUT: 1450 mL / NET: -610 mL        PHYSICAL EXAM:  GENERAL: NAD  CHEST/LUNG: Clear to auscultation bilaterally; No wheeze  HEART: Regular rate and rhythm  ABDOMEN: Soft, Nontender, Nondistended  EXTREMITIES: no LE edema  PSYCH: Calm  NEUROLOGY: Alert, awake responsive  SKIN: No rashes or lesions    LABS:                        9.5    3.83  )-----------( 195      ( 02 Jun 2023 05:12 )             28.5     06-03    133<L>  |  97<L>  |  26<H>  ----------------------------<  181<H>  4.0   |  21<L>  |  1.36<H>    Ca    8.6      03 Jun 2023 10:31  Phos  2.9     06-02  Mg     1.50     06-02                RADIOLOGY & ADDITIONAL TESTS:    Imaging Personally Reviewed:    Consultant(s) Notes Reviewed:      Care Discussed with Consultants/Other Providers:

## 2023-06-03 NOTE — PROGRESS NOTE ADULT - PROBLEM SELECTOR PLAN 5
Hgb A1c 4.6   Can d/c ISS

## 2023-06-03 NOTE — DISCHARGE NOTE PROVIDER - NSDCCPCAREPLAN_GEN_ALL_CORE_FT
PRINCIPAL DISCHARGE DIAGNOSIS  Diagnosis: Near syncope  Assessment and Plan of Treatment: You came into the hospital with dizziness, this was since subsided, you were seen by a neurologist, please follow up with your PCP within 1-2 weeks after D/C      SECONDARY DISCHARGE DIAGNOSES  Diagnosis: Hyperlipidemia  Assessment and Plan of Treatment: Continue prescribed medications to control your cholesterol levels and a DASH (Low fat/salt) diet. Follow up with your primary care provider upon discharge for further management and monitoring of cholesterol levels.    Diagnosis: Lung infiltrate  Assessment and Plan of Treatment: You had a chest xray and found to have pneumonia, please continue antibiotics for 4 days after D/C and follow up with your PCP within 1-2 weeks After D/C    Diagnosis: Hypertension  Assessment and Plan of Treatment: Continue blood pressure medication regimen as directed. Monitor for any visual changes, headaches or dizziness.  Monitor blood pressure regularly.  Follow up with your primary care provider for further management for high blood pressure.     PRINCIPAL DISCHARGE DIAGNOSIS  Diagnosis: Near syncope  Assessment and Plan of Treatment: You came into the hospital with dizziness, this was since subsided, you were seen by a neurologist, please follow up with your PCP within 1-2 weeks after D/C, you had head ct which did not show any stroke      SECONDARY DISCHARGE DIAGNOSES  Diagnosis: Hypertension  Assessment and Plan of Treatment: Continue blood pressure medication regimen as directed. Monitor for any visual changes, headaches or dizziness.  Monitor blood pressure regularly.  Follow up with your primary care provider for further management for high blood pressure.    Diagnosis: Hyperlipidemia  Assessment and Plan of Treatment: Continue prescribed medications to control your cholesterol levels and a DASH (Low fat/salt) diet. Follow up with your primary care provider upon discharge for further management and monitoring of cholesterol levels.    Diagnosis: Lung infiltrate  Assessment and Plan of Treatment: You had a chest xray and found to have pneumonia, please continue antibiotics for 4 days after D/C and follow up with your PCP within 1-2 weeks After D/C

## 2023-06-03 NOTE — DISCHARGE NOTE PROVIDER - NSDCMRMEDTOKEN_GEN_ALL_CORE_FT
amLODIPine 10 mg oral tablet: 1 tab(s) orally once a day (at bedtime)  aspirin 81 mg oral tablet, chewable: 1 tab(s) orally once a day  atorvastatin 20 mg oral tablet: 1 tab(s) orally once a day (at bedtime)  levoFLOXacin 750 mg oral tablet: 1 tab(s) orally every 24 hours Test Script  [facility] [id number]  metoprolol succinate 25 mg oral tablet, extended release: 1 tab(s) orally once a day   amLODIPine 10 mg oral tablet: 1 tab(s) orally once a day (at bedtime)  aspirin 81 mg oral tablet, chewable: 1 tab(s) orally once a day  atorvastatin 20 mg oral tablet: 1 tab(s) orally once a day (at bedtime)  doxazosin 2 mg oral tablet: 1 tab(s) orally once a day (at bedtime)  levoFLOXacin 750 mg oral tablet: 1 tab(s) orally every 24 hours  metoprolol succinate 25 mg oral tablet, extended release: 1 tab(s) orally once a day

## 2023-06-03 NOTE — PROGRESS NOTE ADULT - PROBLEM SELECTOR PLAN 2
-CXR w/ hazy left perihilar opacity  -Procalcitonin 0.06   - CT chest Multifocal groundglass opacities predominantly involving the left upper lobe as well as the bilateral superior lower lobes and right anterior upper lobe. Findings may represent multifocal pneumonia   RVP neg, pulm consulted, started on levaquin for pna, adjust dose per renal function as creatinine is elevated today to 1.7  f/u pulm recs
-CXR w/ hazy left perihilar opacity  -Procalcitonin 0.06   - CT chest Multifocal groundglass opacities predominantly involving the left upper lobe as well as the bilateral superior lower lobes and right anterior upper lobe. Findings may represent multifocal pneumonia   RVP neg, pulm consulted, started on levaquin for pna, adjust dose per renal function as creatinine is elevated, cont levaquin  f/u pulm recs
-CXR w/ hazy left perihilar opacity  -Procalcitonin 0.06   -Hold further antibiotics for now  -Obtain CT chest
-CXR w/ hazy left perihilar opacity  -Procalcitonin 0.06   - CT chest Multifocal groundglass opacities predominantly involving the left upper lobe as well as the bilateral superior lower lobes and right anterior upper lobe. Findings may represent multifocal pneumonia   RVP neg, pulm consulted, started on levaquin for pna, adjust dose per renal function as creatinine is elevated, cont levaquin to complete total of 7 day course  f/u pulm recs
-CXR w/ hazy left perihilar opacity  -Procalcitonin 0.06   - CT chest Multifocal groundglass opacities predominantly involving the left upper lobe as well as the bilateral superior lower lobes and right anterior upper lobe. Findings may represent multifocal pneumonia   RVP neg, pulm consulted, started on levaquin for pna  f/u pulm recs
-CXR w/ hazy left perihilar opacity  -Procalcitonin 0.06   -Hold further antibiotics for now  - CT chest Multifocal groundglass opacities predominantly involving the left upper lobe as well as the bilateral superior lower lobes and right anterior upper lobe. Findings may represent multifocal pneumonia   RVP neg, discussed with pulm unclear etiology  f/u pulm recs  curbsided ID rec to hold off on abx
-CXR w/ hazy left perihilar opacity  -Procalcitonin 0.06   -Hold further antibiotics for now  -Obtain CT chest

## 2023-06-03 NOTE — DISCHARGE NOTE NURSING/CASE MANAGEMENT/SOCIAL WORK - PATIENT PORTAL LINK FT
You can access the FollowMyHealth Patient Portal offered by [facility] by registering at the following website: [email or web address]. By joining [facility]’s FollowMyHealth portal, you will also be able to view your health information using other applications (apps) compatible with our system.

## 2023-06-03 NOTE — PROGRESS NOTE ADULT - PROBLEM/PLAN-8
No
DISPLAY PLAN FREE TEXT

## 2023-06-03 NOTE — DISCHARGE NOTE NURSING/CASE MANAGEMENT/SOCIAL WORK - NSDCFUADDAPPT_GEN_ALL_CORE_FT
If you do not have a primary care provider, you can follow up in the [facility]

## 2023-06-03 NOTE — PROGRESS NOTE ADULT - PROBLEM SELECTOR PROBLEM 2
Lung infiltrate

## 2023-06-03 NOTE — DISCHARGE NOTE PROVIDER - HOSPITAL COURSE
68-year-old male history of hypertension, diabetes, hyperlipidemia multiple antihypertensives and metformin presenting  with near syncopal episode in the setting of ETOH. Currently admitted for pre-syncope work up.    Pre-syncope.   -Possibly related to alcohol use   -Troponin 21-->19  -Continue telemetry   - TTE Normal left ventricular systolic function. No segmental wall motion abnormalities. Normal right ventricular size and function.  -orthostatics neg   -d-dimer < 150  -Head ct No evidence of acute intracranial abnormality.  No evidence of hemorrhage.    Lung infiltrate.   -CXR w/ hazy left perihilar opacity  -Procalcitonin 0.06   - CT chest Multifocal groundglass opacities predominantly involving the left upper lobe as well as the bilateral superior lower lobes and right anterior upper lobe. Findings may represent multifocal pneumonia   RVP neg, pulm consulted, started on levaquin for pna, adjust dose per renal function as creatinine is elevated, cont levaquin  f/u pulm recs.    JESSICA (acute kidney injury).   - Unsure if this is CKD vs JESSICA  - holding ACE   - Avoid nephrotoxic agents   - UA neg  - renal US showed No hydronephrosis of either kidney.  - Monitor Cr, uptrended to 1.7 on 6/1, await check bladder scan, will give gentle hydration  - creatinine down trended to 1.5 today.    Hypertension.   - Elevated BP d/t being off meds since admission   - cont Norvasc 10mg daily and Cardura 2mg qhs   - hold Enalapril d/t renal failure   - monitor BP, cont metoprolol for better bp control.    Diabetes mellitus.   - Hgb A1c 4.6   - Can d/c ISS.    Hyperlipidemia.   - c/w atorvastatin.    Alcohol use.   - Monitor on CIWA protocol   - No evidence of withdrawal.    Need for prophylactic measure.   - SCD as ppx    -Hypomagnesemia  - replete mag  - f/u kidney sono     Patient seen and evaluated. Reviewed discharge medications with patient and attending. All new medications requiring new prescriptions were sent to the pharmacy of patient's choice. Reviewed need for prescription for previous home medications and new prescriptions sent if requested. Medically cleared/stable for discharge as per Dr. Martinez on 6/3/23 with appropriate follow up. Patient understands and agrees with plan of care.      68-year-old male history of hypertension, diabetes, hyperlipidemia multiple antihypertensives and metformin presenting  with near syncopal episode in the setting of ETOH. Currently admitted for pre-syncope work up.    Pre-syncope.   -Possibly related to alcohol use   -Troponin 21-->19  -Continue telemetry   - TTE Normal left ventricular systolic function. No segmental wall motion abnormalities. Normal right ventricular size and function.  -orthostatics neg   -d-dimer < 150  -Head ct No evidence of acute intracranial abnormality.  No evidence of hemorrhage.    Lung infiltrate.   -CXR w/ hazy left perihilar opacity  -Procalcitonin 0.06   - CT chest Multifocal groundglass opacities predominantly involving the left upper lobe as well as the bilateral superior lower lobes and right anterior upper lobe. Findings may represent multifocal pneumonia   RVP neg, pulm consulted, started on levaquin for pna, adjust dose per renal function as creatinine is elevated, to complete abx course with  levaquin  f/u pulm recs.    JESSICA (acute kidney injury).   - Unsure if this is CKD vs JESSICA  - holding ACE   - Avoid nephrotoxic agents   - UA neg  - renal US showed No hydronephrosis of either kidney.  - Monitor Cr, uptrended to 1.7 on 6/1, downtrended to 1.3 on day of dc, renal sono with no hydronephrosis    Hypertension.   - Elevated BP d/t being off meds since admission   - cont Norvasc 10mg daily and Cardura 2mg qhs   - hold Enalapril d/t renal failure   - monitor BP, cont metoprolol for better bp control.    Diabetes mellitus.   - Hgb A1c 4.6   - Can d/c ISS.    Hyperlipidemia.   - c/w atorvastatin.    Alcohol use.   - Monitor on CIWA protocol   - No evidence of withdrawal.         Patient seen and evaluated. Reviewed discharge medications with patient and attending. All new medications requiring new prescriptions were sent to the pharmacy of patient's choice. Reviewed need for prescription for previous home medications and new prescriptions sent if requested. Medically cleared/stable for discharge as per Dr. Martinez on 6/3/23 with appropriate follow up. Patient understands and agrees with plan of care.

## 2023-06-03 NOTE — PROGRESS NOTE ADULT - PROBLEM SELECTOR PLAN 4
Elevated BP d/t being off meds since admission   - cont Norvasc 10mg daily and Cardura 2mg qhs   - hold Enalapril d/t renal failure   - monitor BP, cont metoprolol for better bp control
Elevated BP d/t being off meds since admission   - cont Norvasc 10mg daily and Cardura 2mg qhs   - hold Enalapril d/t renal failure   - monitor BP, cont metoprolol for better bp control
Monitor BP
Elevated BP d/t being off meds since admission   - cont Norvasc 10mg daily and Cardura 2mg qhs   - hold Enalapril d/t renal failure   - monitor BP, cont metoprolol for better bp control
Elevated BP d/t being off meds since admission   - cont Norvasc 10mg daily and Cardura 2mg qhs   - hold Enalapril d/t renal failure   - monitor BP, will adjust meds prn
Elevated BP d/t being off meds since admission   - restart Norvasc 10mg daily and Cardura 2mg qhs   - hold Enalapril d/t renal failure   - monitor BP
Elevated BP d/t being off meds since admission   - cont Norvasc 10mg daily and Cardura 2mg qhs   - hold Enalapril d/t renal failure   - monitor BP, add metoprolol for better bp control

## 2023-06-03 NOTE — PROGRESS NOTE ADULT - PROBLEM SELECTOR PROBLEM 3
JESSICA (acute kidney injury)

## 2023-06-27 ENCOUNTER — APPOINTMENT (OUTPATIENT)
Dept: INTERNAL MEDICINE | Facility: CLINIC | Age: 68
End: 2023-06-27

## 2023-07-05 PROBLEM — I10 ESSENTIAL (PRIMARY) HYPERTENSION: Chronic | Status: ACTIVE | Noted: 2023-05-27

## 2023-07-05 PROBLEM — E11.9 TYPE 2 DIABETES MELLITUS WITHOUT COMPLICATIONS: Chronic | Status: ACTIVE | Noted: 2023-05-27

## 2023-07-05 PROBLEM — E78.5 HYPERLIPIDEMIA, UNSPECIFIED: Chronic | Status: ACTIVE | Noted: 2023-05-27

## 2023-07-24 ENCOUNTER — APPOINTMENT (OUTPATIENT)
Dept: INTERNAL MEDICINE | Facility: CLINIC | Age: 68
End: 2023-07-24

## 2023-07-24 DIAGNOSIS — E78.5 HYPERLIPIDEMIA, UNSPECIFIED: ICD-10-CM

## 2023-07-24 DIAGNOSIS — I10 ESSENTIAL (PRIMARY) HYPERTENSION: ICD-10-CM

## 2023-07-24 NOTE — HISTORY OF PRESENT ILLNESS
[de-identified] : 67 yo M with a hx of HTN, DM2 (A1c 4.6 on 5/27), HLD (on atorvastatin), and EtOH use with a recent hospitalization for a near syncopal episode (TTE normal, CTH neg, orthostatics neg), found to have multifocal PNA treated with Levaquin and an JESSICA, presenting now for

## 2024-01-11 NOTE — SBIRT NOTE ADULT - NSSBIRTNALOXDUR_GEN_A_CORE
40
Vital Signs Last 24 Hrs  T(C): 36.5 (11 Jan 2024 01:09), Max: 36.8 (11 Jan 2024 00:10)  T(F): 97.7 (11 Jan 2024 01:09), Max: 98.3 (11 Jan 2024 00:10)  HR: 81 (11 Jan 2024 01:09) (81 - 90)  BP: 116/63 (11 Jan 2024 01:09) (108/79 - 116/63)  BP(mean): --  RR: 18 (11 Jan 2024 01:09) (15 - 18)  SpO2: 100% (11 Jan 2024 01:09) (97% - 100%)    Parameters below as of 11 Jan 2024 01:09  Patient On (Oxygen Delivery Method): room air        CONSTITUTIONAL: Well-groomed, in no apparent distress  EYES: No conjunctival or scleral injection, non-icteric; PERRLA and symmetric  ENMT: No external nasal lesions; nasal mucosa not inflamed; oral mucosa with moist membranes  NECK: Trachea midline without palpable neck mass; thyroid not enlarged and non-tender  RESPIRATORY: Breathing comfortably; lungs CTA without wheeze/rhonchi/rales  CARDIOVASCULAR: +S1S2, RRR, no M/G/R; 1+ non-pitting edema bilaterally, pain to palpation of calf bilaterally (L>R)  GASTROINTESTINAL: No palpable masses or tenderness, +BS throughout, no rebound/guarding; no hepatosplenomegaly; no hernia palpated  GENITOURINARY: nephrostomy tube in place right, non-purulent serous liquid   MUSCULOSKELETAL: no digital clubbing or cyanosis; normal strength and tone of extremities  SKIN: No rashes or ulcers noted; no subcutaneous nodules or induration palpable  NEUROLOGIC: CN II-XII intact;  sensation intact in LEs b/l to light touch  PSYCHIATRIC: A+O x 3; mood and affect appropriate; appropriate insight and judgment

## 2024-07-17 NOTE — PROGRESS NOTE ADULT - PROBLEM SELECTOR PROBLEM 7
Alcohol use